# Patient Record
Sex: MALE | Race: WHITE | NOT HISPANIC OR LATINO | Employment: UNEMPLOYED | URBAN - METROPOLITAN AREA
[De-identification: names, ages, dates, MRNs, and addresses within clinical notes are randomized per-mention and may not be internally consistent; named-entity substitution may affect disease eponyms.]

---

## 2020-02-07 ENCOUNTER — HOSPITAL ENCOUNTER (EMERGENCY)
Facility: HOSPITAL | Age: 60
Discharge: HOME | End: 2020-02-07
Attending: EMERGENCY MEDICINE
Payer: MEDICAID

## 2020-02-07 VITALS
DIASTOLIC BLOOD PRESSURE: 111 MMHG | SYSTOLIC BLOOD PRESSURE: 196 MMHG | RESPIRATION RATE: 20 BRPM | HEART RATE: 109 BPM | OXYGEN SATURATION: 99 % | TEMPERATURE: 98 F

## 2020-02-07 DIAGNOSIS — S22.41XD MULTIPLE FRACTURES OF RIBS, RIGHT SIDE, SUBSEQUENT ENCOUNTER FOR FRACTURE WITH ROUTINE HEALING: Primary | ICD-10-CM

## 2020-02-07 PROCEDURE — 99283 EMERGENCY DEPT VISIT LOW MDM: CPT | Mod: 25

## 2020-02-07 PROCEDURE — 25000003 PHARM REV CODE 250: Performed by: EMERGENCY MEDICINE

## 2020-02-07 RX ORDER — HYDROCODONE BITARTRATE AND ACETAMINOPHEN 5; 325 MG/1; MG/1
1 TABLET ORAL
Status: COMPLETED | OUTPATIENT
Start: 2020-02-07 | End: 2020-02-07

## 2020-02-07 RX ORDER — HYDROCODONE BITARTRATE AND ACETAMINOPHEN 5; 325 MG/1; MG/1
1 TABLET ORAL EVERY 4 HOURS PRN
Qty: 18 TABLET | Refills: 0 | Status: SHIPPED | OUTPATIENT
Start: 2020-02-07

## 2020-02-07 RX ADMIN — HYDROCODONE BITARTRATE AND ACETAMINOPHEN 1 TABLET: 5; 325 TABLET ORAL at 10:02

## 2020-02-07 NOTE — ED NOTES
Pt states he fell last night and landed on his ribs and hip.  Pt able to ambulate around room without difficulty but states his pain is more to his right ribs.  Pt states he was unable to go to sleep last pm due to the pain.
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
## 2020-05-31 ENCOUNTER — HOSPITAL ENCOUNTER (INPATIENT)
Facility: HOSPITAL | Age: 60
LOS: 2 days | Discharge: HOME | End: 2020-06-02
Attending: EMERGENCY MEDICINE | Admitting: INTERNAL MEDICINE
Payer: MEDICAID

## 2020-05-31 DIAGNOSIS — R06.02 SOB (SHORTNESS OF BREATH): ICD-10-CM

## 2020-05-31 DIAGNOSIS — F10.10 ALCOHOL ABUSE: ICD-10-CM

## 2020-05-31 DIAGNOSIS — K92.2 ACUTE GI BLEEDING: Primary | ICD-10-CM

## 2020-05-31 LAB
ABO + RH BLD: NORMAL
ALBUMIN SERPL BCP-MCNC: 3 G/DL (ref 3.5–5.2)
ALP SERPL-CCNC: 58 U/L (ref 55–135)
ALT SERPL W/O P-5'-P-CCNC: 29 U/L (ref 10–44)
ANION GAP SERPL CALC-SCNC: 8 MMOL/L (ref 8–16)
APTT PPP: 25.5 SEC (ref 23.6–33.3)
AST SERPL-CCNC: 26 U/L (ref 10–40)
BASOPHILS # BLD AUTO: 0.01 K/UL (ref 0–0.2)
BASOPHILS NFR BLD: 0.1 % (ref 0–1.9)
BILIRUB SERPL-MCNC: 1.3 MG/DL (ref 0.1–1)
BLD GP AB SCN CELLS X3 SERPL QL: NORMAL
BNP SERPL-MCNC: 37 PG/ML (ref 0–99)
BUN SERPL-MCNC: 57 MG/DL (ref 6–20)
CALCIUM SERPL-MCNC: 8.2 MG/DL (ref 8.7–10.5)
CHLORIDE SERPL-SCNC: 102 MMOL/L (ref 95–110)
CO2 SERPL-SCNC: 23 MMOL/L (ref 23–29)
CREAT SERPL-MCNC: 1.1 MG/DL (ref 0.5–1.4)
DIFFERENTIAL METHOD: ABNORMAL
EOSINOPHIL # BLD AUTO: 0 K/UL (ref 0–0.5)
EOSINOPHIL NFR BLD: 0 % (ref 0–8)
ERYTHROCYTE [DISTWIDTH] IN BLOOD BY AUTOMATED COUNT: 12.9 % (ref 11.5–14.5)
EST. GFR  (AFRICAN AMERICAN): >60 ML/MIN/1.73 M^2
EST. GFR  (NON AFRICAN AMERICAN): >60 ML/MIN/1.73 M^2
GLUCOSE SERPL-MCNC: 224 MG/DL (ref 70–110)
HCT VFR BLD AUTO: 22.7 % (ref 40–54)
HCT VFR BLD AUTO: 25.6 % (ref 40–54)
HGB BLD-MCNC: 7.9 G/DL (ref 14–18)
HGB BLD-MCNC: 9 G/DL (ref 14–18)
IMM GRANULOCYTES # BLD AUTO: 0.08 K/UL (ref 0–0.04)
IMM GRANULOCYTES NFR BLD AUTO: 0.7 % (ref 0–0.5)
INR PPP: 1.1
LYMPHOCYTES # BLD AUTO: 0.9 K/UL (ref 1–4.8)
LYMPHOCYTES NFR BLD: 8.1 % (ref 18–48)
MAGNESIUM SERPL-MCNC: 1.8 MG/DL (ref 1.6–2.6)
MCH RBC QN AUTO: 33 PG (ref 27–31)
MCHC RBC AUTO-ENTMCNC: 35.2 G/DL (ref 32–36)
MCV RBC AUTO: 94 FL (ref 82–98)
MONOCYTES # BLD AUTO: 0.9 K/UL (ref 0.3–1)
MONOCYTES NFR BLD: 8 % (ref 4–15)
NEUTROPHILS # BLD AUTO: 9.1 K/UL (ref 1.8–7.7)
NEUTROPHILS NFR BLD: 83.1 % (ref 38–73)
NRBC BLD-RTO: 0 /100 WBC
OB PNL STL: POSITIVE
PLATELET # BLD AUTO: 184 K/UL (ref 150–350)
PMV BLD AUTO: 9.8 FL (ref 9.2–12.9)
POTASSIUM SERPL-SCNC: 4.4 MMOL/L (ref 3.5–5.1)
PROT SERPL-MCNC: 5.4 G/DL (ref 6–8.4)
PROTHROMBIN TIME: 14 SEC (ref 10.6–14.8)
RBC # BLD AUTO: 2.73 M/UL (ref 4.6–6.2)
SARS-COV-2 RDRP RESP QL NAA+PROBE: NEGATIVE
SODIUM SERPL-SCNC: 133 MMOL/L (ref 136–145)
TROPONIN I SERPL DL<=0.01 NG/ML-MCNC: 0.03 NG/ML
WBC # BLD AUTO: 10.99 K/UL (ref 3.9–12.7)

## 2020-05-31 PROCEDURE — U0002 COVID-19 LAB TEST NON-CDC: HCPCS

## 2020-05-31 PROCEDURE — 86850 RBC ANTIBODY SCREEN: CPT

## 2020-05-31 PROCEDURE — 27000221 HC OXYGEN, UP TO 24 HOURS

## 2020-05-31 PROCEDURE — 25000003 PHARM REV CODE 250: Performed by: NURSE PRACTITIONER

## 2020-05-31 PROCEDURE — 25000003 PHARM REV CODE 250: Performed by: EMERGENCY MEDICINE

## 2020-05-31 PROCEDURE — 85610 PROTHROMBIN TIME: CPT

## 2020-05-31 PROCEDURE — 25000003 PHARM REV CODE 250: Performed by: INTERNAL MEDICINE

## 2020-05-31 PROCEDURE — 94761 N-INVAS EAR/PLS OXIMETRY MLT: CPT

## 2020-05-31 PROCEDURE — 83036 HEMOGLOBIN GLYCOSYLATED A1C: CPT

## 2020-05-31 PROCEDURE — 85014 HEMATOCRIT: CPT

## 2020-05-31 PROCEDURE — 96375 TX/PRO/DX INJ NEW DRUG ADDON: CPT

## 2020-05-31 PROCEDURE — C9113 INJ PANTOPRAZOLE SODIUM, VIA: HCPCS | Performed by: EMERGENCY MEDICINE

## 2020-05-31 PROCEDURE — 86920 COMPATIBILITY TEST SPIN: CPT

## 2020-05-31 PROCEDURE — 99900035 HC TECH TIME PER 15 MIN (STAT)

## 2020-05-31 PROCEDURE — 36430 TRANSFUSION BLD/BLD COMPNT: CPT

## 2020-05-31 PROCEDURE — 12000002 HC ACUTE/MED SURGE SEMI-PRIVATE ROOM

## 2020-05-31 PROCEDURE — C9113 INJ PANTOPRAZOLE SODIUM, VIA: HCPCS | Performed by: INTERNAL MEDICINE

## 2020-05-31 PROCEDURE — 85730 THROMBOPLASTIN TIME PARTIAL: CPT

## 2020-05-31 PROCEDURE — 20000000 HC ICU ROOM

## 2020-05-31 PROCEDURE — 82272 OCCULT BLD FECES 1-3 TESTS: CPT

## 2020-05-31 PROCEDURE — 99291 CRITICAL CARE FIRST HOUR: CPT

## 2020-05-31 PROCEDURE — 36415 COLL VENOUS BLD VENIPUNCTURE: CPT

## 2020-05-31 PROCEDURE — 83880 ASSAY OF NATRIURETIC PEPTIDE: CPT

## 2020-05-31 PROCEDURE — 85025 COMPLETE CBC W/AUTO DIFF WBC: CPT

## 2020-05-31 PROCEDURE — 84484 ASSAY OF TROPONIN QUANT: CPT

## 2020-05-31 PROCEDURE — 83735 ASSAY OF MAGNESIUM: CPT

## 2020-05-31 PROCEDURE — 80053 COMPREHEN METABOLIC PANEL: CPT

## 2020-05-31 PROCEDURE — P9016 RBC LEUKOCYTES REDUCED: HCPCS

## 2020-05-31 PROCEDURE — 96365 THER/PROPH/DIAG IV INF INIT: CPT

## 2020-05-31 PROCEDURE — 63600175 PHARM REV CODE 636 W HCPCS: Performed by: NURSE PRACTITIONER

## 2020-05-31 PROCEDURE — 63600175 PHARM REV CODE 636 W HCPCS: Performed by: EMERGENCY MEDICINE

## 2020-05-31 PROCEDURE — 63600175 PHARM REV CODE 636 W HCPCS: Performed by: INTERNAL MEDICINE

## 2020-05-31 PROCEDURE — 85018 HEMOGLOBIN: CPT | Mod: 91

## 2020-05-31 PROCEDURE — 93005 ELECTROCARDIOGRAM TRACING: CPT | Performed by: INTERNAL MEDICINE

## 2020-05-31 RX ORDER — HYDROCODONE BITARTRATE AND ACETAMINOPHEN 500; 5 MG/1; MG/1
TABLET ORAL
Status: DISCONTINUED | OUTPATIENT
Start: 2020-05-31 | End: 2020-06-02 | Stop reason: HOSPADM

## 2020-05-31 RX ORDER — OCTREOTIDE ACETATE 100 UG/ML
50 INJECTION, SOLUTION INTRAVENOUS; SUBCUTANEOUS ONCE
Status: COMPLETED | OUTPATIENT
Start: 2020-05-31 | End: 2020-05-31

## 2020-05-31 RX ORDER — SODIUM CHLORIDE 0.9 % (FLUSH) 0.9 %
10 SYRINGE (ML) INJECTION
Status: DISCONTINUED | OUTPATIENT
Start: 2020-05-31 | End: 2020-06-02 | Stop reason: HOSPADM

## 2020-05-31 RX ORDER — ACETAMINOPHEN 500 MG
1000 TABLET ORAL EVERY 6 HOURS PRN
COMMUNITY

## 2020-05-31 RX ORDER — POLYETHYLENE GLYCOL 3350, SODIUM CHLORIDE, SODIUM BICARBONATE, POTASSIUM CHLORIDE 420; 11.2; 5.72; 1.48 G/4L; G/4L; G/4L; G/4L
1 POWDER, FOR SOLUTION ORAL ONCE
Status: DISCONTINUED | OUTPATIENT
Start: 2020-05-31 | End: 2020-05-31

## 2020-05-31 RX ORDER — ONDANSETRON 2 MG/ML
4 INJECTION INTRAMUSCULAR; INTRAVENOUS EVERY 6 HOURS PRN
Status: DISCONTINUED | OUTPATIENT
Start: 2020-05-31 | End: 2020-06-02 | Stop reason: HOSPADM

## 2020-05-31 RX ORDER — SODIUM CHLORIDE 9 MG/ML
INJECTION, SOLUTION INTRAVENOUS CONTINUOUS
Status: DISCONTINUED | OUTPATIENT
Start: 2020-05-31 | End: 2020-06-01

## 2020-05-31 RX ORDER — LORAZEPAM 2 MG/ML
1 INJECTION INTRAMUSCULAR
Status: DISCONTINUED | OUTPATIENT
Start: 2020-05-31 | End: 2020-06-02 | Stop reason: HOSPADM

## 2020-05-31 RX ORDER — ACETAMINOPHEN 325 MG/1
650 TABLET ORAL EVERY 4 HOURS PRN
Status: DISCONTINUED | OUTPATIENT
Start: 2020-05-31 | End: 2020-06-02 | Stop reason: HOSPADM

## 2020-05-31 RX ORDER — CHLORDIAZEPOXIDE HYDROCHLORIDE 25 MG/1
25 CAPSULE, GELATIN COATED ORAL 3 TIMES DAILY
Status: DISCONTINUED | OUTPATIENT
Start: 2020-05-31 | End: 2020-06-02 | Stop reason: HOSPADM

## 2020-05-31 RX ORDER — PANTOPRAZOLE SODIUM 40 MG/10ML
80 INJECTION, POWDER, LYOPHILIZED, FOR SOLUTION INTRAVENOUS
Status: COMPLETED | OUTPATIENT
Start: 2020-05-31 | End: 2020-05-31

## 2020-05-31 RX ADMIN — PANTOPRAZOLE SODIUM 8 MG/HR: 40 INJECTION, POWDER, FOR SOLUTION INTRAVENOUS at 05:05

## 2020-05-31 RX ADMIN — PANTOPRAZOLE SODIUM 8 MG/HR: 40 INJECTION, POWDER, FOR SOLUTION INTRAVENOUS at 12:05

## 2020-05-31 RX ADMIN — SODIUM CHLORIDE: 0.9 INJECTION, SOLUTION INTRAVENOUS at 03:05

## 2020-05-31 RX ADMIN — LORAZEPAM 1 MG: 2 INJECTION INTRAMUSCULAR; INTRAVENOUS at 04:05

## 2020-05-31 RX ADMIN — PANTOPRAZOLE SODIUM 8 MG/HR: 40 INJECTION, POWDER, FOR SOLUTION INTRAVENOUS at 11:05

## 2020-05-31 RX ADMIN — PANTOPRAZOLE SODIUM 80 MG: 40 INJECTION, POWDER, FOR SOLUTION INTRAVENOUS at 11:05

## 2020-05-31 RX ADMIN — FOLIC ACID: 5 INJECTION, SOLUTION INTRAMUSCULAR; INTRAVENOUS; SUBCUTANEOUS at 05:05

## 2020-05-31 RX ADMIN — PANTOPRAZOLE SODIUM 8 MG/HR: 40 INJECTION, POWDER, FOR SOLUTION INTRAVENOUS at 08:05

## 2020-05-31 RX ADMIN — OCTREOTIDE ACETATE 50 MCG/HR: 500 INJECTION, SOLUTION INTRAVENOUS; SUBCUTANEOUS at 05:05

## 2020-05-31 RX ADMIN — SODIUM CHLORIDE 500 ML: 0.9 INJECTION, SOLUTION INTRAVENOUS at 12:05

## 2020-05-31 RX ADMIN — OCTREOTIDE ACETATE 50 MCG/HR: 500 INJECTION, SOLUTION INTRAVENOUS; SUBCUTANEOUS at 11:05

## 2020-05-31 RX ADMIN — OCTREOTIDE ACETATE 50 MCG: 100 INJECTION, SOLUTION INTRAVENOUS; SUBCUTANEOUS at 05:05

## 2020-05-31 RX ADMIN — CHLORDIAZEPOXIDE HYDROCHLORIDE 25 MG: 25 CAPSULE ORAL at 08:05

## 2020-05-31 NOTE — H&P
[facility] History & Physical Examination   Patient Name: Howard Jefferson Jr.  MRN: [id number]  Patient Class: IP- Inpatient   Admission Date: 5/31/2020 11:07 AM  Length of Stay: 0  Attending Physician: Leonardo Brantley MD  Primary Care Provider: Primary Doctor No  Face-to-Face encounter date: 05/31/2020  Code Status: full code  Chief Complaint: Shortness of Breath (x 2 days) and Cough        Patient information was obtained from patient, past medical records and ER records.   HISTORY OF PRESENT ILLNESS:   Howard Jefferson Jr. is a 60 y.o. White male who  has a past medical history of Hypertension and Stroke.. The patient presented to [facility] on 5/31/2020 with a primary complaint of Shortness of Breath (x 2 days) and Cough    History was obtained from the patient and the family and ER physician Sign-out. Patient presents to the ED with the complaint of SOB, black stools, and vomiting over the past 2 days. The patient reports a total of 3 BMs at home that he describes looks like tar. This morning his stool had small amount of bright red blood. He has associated nausea with NBNB emesis. SOB is worse with exertion. No alleviating factors. He states this morning he became extremely weak, he could barely walk, so his sons put him in the car and brought him to the ED to be evaluated. He reports he drinks about 12pk of beer a day. His last beer was on yesterday. He reports a history of HTN, but is not currently on any home medication. He denies fever, chills, abdominal pain, hematemesis, hemoptysis, numbness, tingling, or LOC.     In the ED, he is mildly hypotensive. CBC with H/H 9/25. CMP with BUN 57, sodium 133, glucose 224. BNP and troponin was negative. EKG with ST at 113 and nonspecific ST/T wave abnormality.     Decision to admit was taken and patient was informed about the plan of care.   REVIEW OF SYSTEMS:   10 Point Review of System was performed and was found to be  "negative except for that mentioned already in the HPI above.     PAST MEDICAL HISTORY:     Past Medical History:   Diagnosis Date    Hypertension     Stroke        PAST SURGICAL HISTORY:   No past surgical history on file.    ALLERGIES:   Patient has no known allergies.    FAMILY HISTORY:   No family history on file.    SOCIAL HISTORY:     Social History     Tobacco Use    Smoking status: Current Every Day Smoker   Substance Use Topics    Alcohol use: Yes        Social History     Substance and Sexual Activity   Sexual Activity Not on file        HOME MEDICATIONS:     Prior to Admission medications    Medication Sig Start Date End Date Taking? Authorizing Provider   acetaminophen (TYLENOL) 500 MG tablet Take 1,000 mg by mouth every 6 (six) hours as needed for Pain.   Yes Historical Provider, MD         PHYSICAL EXAM:   BP 99/65   Pulse 98   Temp 97.8 °F (36.6 °C) (Oral)   Resp (!) 28   Ht 5' 11" (1.803 m)   Wt 63.5 kg (140 lb)   SpO2 97%   BMI 19.53 kg/m²   Vitals Reviewed  General appearance: Well-developed, thin White male who is sickly appearing.  Skin: No Rash. Warm, dry  Neuro: Generalized weakness. Motor and sensory exams grossly intact. Good tone. Power in all 4 extremities 5/5.   HENT: Atraumatic head. Moist mucous membranes of oral cavity.  Eyes: Normal extraocular movements. PERRLA  Neck: Supple. No evidence of lymphadenopathy. No thyroidomegaly.  Lungs: Clear to auscultation bilaterally. No wheezing present.   Heart: Regular rhythm. Mild tachycardia 105bpm. S1 and S2 present with no murmurs/gallop/rub. No pedal edema. No JVD present.   Abdomen: Soft, non-distended, non-tender. No rebound tenderness/guarding. No masses or organomegaly. Bowel sounds are normal. Bladder is not palpable.   Extremities: No cyanosis, clubbing. Capillary refill less than 2 seconds.   Psych/mental status: Alert and oriented. Mood and affect appropriate. Cooperative. Responds appropriately to questions.   EMERGENCY " DEPARTMENT LABS AND IMAGING:     Labs Reviewed   CBC W/ AUTO DIFFERENTIAL - Abnormal; Notable for the following components:       Result Value    RBC 2.73 (*)     Hemoglobin 9.0 (*)     Hematocrit 25.6 (*)     Mean Corpuscular Hemoglobin 33.0 (*)     Immature Granulocytes 0.7 (*)     Gran # (ANC) 9.1 (*)     Immature Grans (Abs) 0.08 (*)     Lymph # 0.9 (*)     Gran% 83.1 (*)     Lymph% 8.1 (*)     All other components within normal limits   COMPREHENSIVE METABOLIC PANEL - Abnormal; Notable for the following components:    Sodium 133 (*)     Glucose 224 (*)     BUN, Bld 57 (*)     Calcium 8.2 (*)     Total Protein 5.4 (*)     Albumin 3.0 (*)     Total Bilirubin 1.3 (*)     All other components within normal limits   OCCULT BLOOD X 1, STOOL - Abnormal; Notable for the following components:    Occult Blood Positive (*)     All other components within normal limits   TROPONIN I   B-TYPE NATRIURETIC PEPTIDE   PROTIME-INR   APTT   SARS-COV-2 RNA AMPLIFICATION, QUAL   MAGNESIUM   HEMOGLOBIN   HEMATOCRIT   TYPE & SCREEN   PREPARE RBC SOFT       X-Ray Chest AP Portable   Final Result          ASSESSMENT & PLAN:   Acute GI bleed:  Admit to ICU  Consult GI  Protonix IV bolus given and continuous gtt started  Clear liquids; Golytely prep per GI recommendation  Plans for EGD/colonoscopy by GI  IV hydration  Serial H/H at 9/25 now  Type and screen; prepare 2 units PRBC    Symptomatic Anemia:  Likely due to GI bleed  Treatment as above    ETOH dependence:  Last drink was yesterday per patient; normally drinks 12 pk of beer daily  Give banana bag x1 now  Start librium 25mg tid  Ativan prn; monitor effect on BP     Alcoholic cirrhosis:  Liver enzymes wnl   Platelets stable at 184  No ascites noted   Bili mildly elevated 1.3  Monitor levels      DVT Prophylaxis: Mechanical DVT prophylaxis and will be advised to be as mobile as possible and sit in a chair as tolerated.    ________________________________________________________________________________    Discharge Planning and Disposition: No mobility needs. Ambulating well. Patient will be discharged in 2-3 days  Face-to-Face encounter date: 05/31/2020  Encounter included review of the medical records, interviewing and examining the patient face-to-face, discussion with family and other health care providers including emergency medicine physician, admission orders, interpreting lab/test results and formulating a plan of care.   Medical Decision Making during this encounter was  [_] Low Complexity  [_] Moderate Complexity  [x] High Complexity    Critical care time: 51 minutes  _________________________________________________________________________________    INPATIENT LIST OF MEDICATIONS     Current Facility-Administered Medications:     0.9%  NaCl infusion, , Intravenous, Continuous, Nicki Dietrich NP    acetaminophen tablet 650 mg, 650 mg, Oral, Q4H PRN, Nicki Dietrich NP    ondansetron injection 4 mg, 4 mg, Intravenous, Q6H PRN, Nicki Dietrich NP    pantoprazole 40 mg in dextrose 5 % 100 mL infusion (ready to mix system), 8 mg/hr, Intravenous, Continuous, Ramin Watson DO, Last Rate: 20 mL/hr at 05/31/20 1238, 8 mg/hr at 05/31/20 1238    peg-electrolyte soln 420 gram SolR 4,000 mL, 1 each, Oral, Once, Nicki Dietrich NP    sodium chloride 0.9% 1,000 mL with mvi, adult no.4 with vit K 3,300 unit- 150 mcg/10 mL 10 mL, thiamine 100 mg, folic acid 1 mg infusion, , Intravenous, Once, Nicki Dietrich NP    sodium chloride 0.9% flush 10 mL, 10 mL, Intravenous, PRN, Nicki Dietrich NP    Current Outpatient Medications:     acetaminophen (TYLENOL) 500 MG tablet, Take 1,000 mg by mouth every 6 (six) hours as needed for Pain., Disp: , Rfl:       Scheduled Meds:   peg-electrolyte soln  1 each Oral Once    Banana bag   Intravenous Once     Continuous Infusions:   sodium chloride 0.9%      pantoprazole 40  mg in dextrose 5 % 100 mL infusion (ready to mix system) 8 mg/hr (05/31/20 1238)     PRN Meds:.acetaminophen, ondansetron, sodium chloride 0.9%      Nicki Dietrich  [facility] Hospitalist  05/31/2020

## 2020-05-31 NOTE — ED NOTES
Patient's daughter in law (Gilda [phone]) called for an update.  Patient gave consent to give Gilda update.

## 2020-05-31 NOTE — ED PROVIDER NOTES
Encounter Date: 5/31/2020       History     Chief Complaint   Patient presents with    Shortness of Breath     x 2 days    Cough     This is a 60-year-old male with no significant past medical history other than alcohol abuse who presents emergency department with shortness of breath, black stools, vomiting.  This is for the last 3 days he has had black stools.  He said he had 3 episode of dark woken stool this morning.  He said he also had some bright red blood tinged stuff in his stool as well.  He has had some intermittent vomiting.  He has not had any associated abdominal pain.  He says he has had some shortness of breath with exertion and sometimes he has a cough but he has some vomiting after he coughs.  He denies any associated chest pain.  He does not have any fever or chills.  No recent travel no one else sick at home.  No contact with corona virus.  He has never had any surgeries on his abdomen in the past.  He says he drinks a 12 pack a day approximately.  He is not on any medications specifically not on any blood thinners.  He denies any street drug use.        Review of patient's allergies indicates:  No Known Allergies  Past Medical History:   Diagnosis Date    Hypertension     Stroke      No past surgical history on file.  No family history on file.  Social History     Tobacco Use    Smoking status: Current Every Day Smoker   Substance Use Topics    Alcohol use: Yes    Drug use: Not Currently     Review of Systems   Constitutional: Positive for appetite change (Poor p.o. intake). Negative for chills and fever.   HENT: Negative for sore throat.    Respiratory: Positive for cough and shortness of breath.    Cardiovascular: Negative for chest pain.   Gastrointestinal: Positive for blood in stool, nausea and vomiting. Negative for abdominal pain.   Genitourinary: Negative for dysuria.   Musculoskeletal: Negative for back pain.   Skin: Negative for rash.   Neurological: Negative for seizures,  weakness and headaches.   Hematological: Does not bruise/bleed easily.   All other systems reviewed and are negative.      Physical Exam     Initial Vitals [05/31/20 1108]   BP Pulse Resp Temp SpO2   123/77 (!) 118 (!) 28 97.8 °F (36.6 °C) 97 %      MAP       --         Physical Exam    Nursing note and vitals reviewed.  Constitutional: He appears well-developed and well-nourished. He is not diaphoretic. No distress.   Cachectic   HENT:   Head: Normocephalic and atraumatic.   Mouth/Throat: Oropharynx is clear and moist. No oropharyngeal exudate.   Eyes: Conjunctivae and EOM are normal. Pupils are equal, round, and reactive to light.   Neck: No tracheal deviation present.   Cardiovascular: Regular rhythm, normal heart sounds and intact distal pulses.   No murmur heard.  Tachycardic   Pulmonary/Chest: Breath sounds normal. No stridor. No respiratory distress. He has no wheezes. He has no rhonchi. He has no rales.   Abdominal: Soft. Bowel sounds are normal. He exhibits no distension. There is no tenderness. There is no rebound and no guarding.   Genitourinary: Rectal exam shows guaiac positive stool. Guaiac positive stool. : Acceptable.  Genitourinary Comments: Rectal exam chaperoned by nursing staff:  Stool is maroon in color.  No active external hemorrhoids noted.   Musculoskeletal: Normal range of motion. He exhibits no edema or tenderness.   Neurological: He is alert and oriented to person, place, and time. He has normal strength. No cranial nerve deficit or sensory deficit.   Skin: Skin is warm and dry. Capillary refill takes less than 2 seconds. No rash noted. No erythema. No pallor.   Psychiatric: He has a normal mood and affect. His behavior is normal. Thought content normal.         ED Course   Procedures  Labs Reviewed   CBC W/ AUTO DIFFERENTIAL   COMPREHENSIVE METABOLIC PANEL   TROPONIN I   B-TYPE NATRIURETIC PEPTIDE   PROTIME-INR   APTT   OCCULT BLOOD X 1, STOOL   SARS-COV-2 RNA  AMPLIFICATION, QUAL   TYPE & SCREEN     EKG Readings: (Independently Interpreted)   EKG 11:21 a.m. sinus tachycardia rate of 113.  Nonspecific ST T wave changes.  No STEMI.  Normal axis.  EKG interpreted independently.        Results for orders placed or performed during the hospital encounter of 05/31/20   CBC auto differential   Result Value Ref Range    WBC 10.99 3.90 - 12.70 K/uL    RBC 2.73 (L) 4.60 - 6.20 M/uL    Hemoglobin 9.0 (L) 14.0 - 18.0 g/dL    Hematocrit 25.6 (L) 40.0 - 54.0 %    Mean Corpuscular Volume 94 82 - 98 fL    Mean Corpuscular Hemoglobin 33.0 (H) 27.0 - 31.0 pg    Mean Corpuscular Hemoglobin Conc 35.2 32.0 - 36.0 g/dL    RDW 12.9 11.5 - 14.5 %    Platelets 184 150 - 350 K/uL    MPV 9.8 9.2 - 12.9 fL    Immature Granulocytes 0.7 (H) 0.0 - 0.5 %    Gran # (ANC) 9.1 (H) 1.8 - 7.7 K/uL    Immature Grans (Abs) 0.08 (H) 0.00 - 0.04 K/uL    Lymph # 0.9 (L) 1.0 - 4.8 K/uL    Mono # 0.9 0.3 - 1.0 K/uL    Eos # 0.0 0.0 - 0.5 K/uL    Baso # 0.01 0.00 - 0.20 K/uL    nRBC 0 0 /100 WBC    Gran% 83.1 (H) 38.0 - 73.0 %    Lymph% 8.1 (L) 18.0 - 48.0 %    Mono% 8.0 4.0 - 15.0 %    Eosinophil% 0.0 0.0 - 8.0 %    Basophil% 0.1 0.0 - 1.9 %    Differential Method Automated    Comprehensive metabolic panel   Result Value Ref Range    Sodium 133 (L) 136 - 145 mmol/L    Potassium 4.4 3.5 - 5.1 mmol/L    Chloride 102 95 - 110 mmol/L    CO2 23 23 - 29 mmol/L    Glucose 224 (H) 70 - 110 mg/dL    BUN, Bld 57 (H) 6 - 20 mg/dL    Creatinine 1.1 0.5 - 1.4 mg/dL    Calcium 8.2 (L) 8.7 - 10.5 mg/dL    Total Protein 5.4 (L) 6.0 - 8.4 g/dL    Albumin 3.0 (L) 3.5 - 5.2 g/dL    Total Bilirubin 1.3 (H) 0.1 - 1.0 mg/dL    Alkaline Phosphatase 58 55 - 135 U/L    AST 26 10 - 40 U/L    ALT 29 10 - 44 U/L    Anion Gap 8 8 - 16 mmol/L    eGFR if African American >60.0 >60 mL/min/1.73 m^2    eGFR if non African American >60.0 >60 mL/min/1.73 m^2   Troponin I   Result Value Ref Range    Troponin I 0.031 <=0.040 ng/mL   Brain natriuretic  peptide   Result Value Ref Range    BNP 37 0 - 99 pg/mL   Protime-INR   Result Value Ref Range    PT 14.0 10.6 - 14.8 sec    INR 1.1    APTT   Result Value Ref Range    aPTT 25.5 23.6 - 33.3 sec   Occult blood x 1, stool   Result Value Ref Range    Occult Blood Positive (A) Negative   COVID-19 Rapid Screening   Result Value Ref Range    SARS-CoV-2 RNA, Amplification, Qual Negative Negative   Magnesium   Result Value Ref Range    Magnesium 1.8 1.6 - 2.6 mg/dL   Type & Screen   Result Value Ref Range    Group & Rh O NEG     Indirect Moise NEG      X-Ray Chest AP Portable   Final Result          Imaging Results          X-Ray Chest AP Portable (In process)                  Medical Decision Making:   ED Management:  Patient presents emergency department with signs and symptoms of upper GI bleed however he also noted some bright red blood in his stool.  On his exam he did have maroonish type stool but he endorses history of black stool.  I discussed with GI who plans to do upper and lower endoscopy.  Started him on a Protonix bolus and drip.  GI stated to hold off on any octreotide at this point.  They will see him in consultation plan to scope him tomorrow.  He has been given 500 cc of fluid emergency department.  Blood pressure stable pulses 110 range.  H/H 9/25              Attending Attestation:         Attending Critical Care:   Critical Care Times:   Direct Patient Care (initial evaluation, reassessments, and time considering the case)................................................................15 minutes.   Additional History from reviewing old medical records or taking additional history from the family, EMS, PCP, etc.......................5 minutes.   Ordering, Reviewing, and Interpreting Diagnostic Studies...............................................................................................................5 minutes.    Documentation..................................................................................................................................................................................5 minutes.   Consultation with other Physicians. .................................................................................................................................................5 minutes.   ==============================================================  · Total Critical Care Time - exclusive of procedural time: 35 minutes.  ==============================================================  Critical care was necessary to treat or prevent imminent or life-threatening deterioration of the following conditions: GI bleeding.   The following critical care procedures were done by me (see procedure notes): blood draw for specimens and pulse oximetry.   Critical care was time spent personally by me on the following activities: obtaining history from patient or relative, examination of patient, review of old charts, ordering lab, x-rays, and/or EKG, development of treatment plan with patient or relative, ordering and performing treatments and interventions, evaluation of patient's response to treatment, discussion with consultants, interpretation of cardiac measurements and re-evaluation of patient's conition.   Critical Care Condition: potentially life-threatening               ED Course as of May 31 1314   Sun May 31, 2020   1213 I discussed the case with Dr. Benites from GI who stated to prep him for an upper and lower scope period was recommended GoLYTELY prep.  Clear liquids.    [JR]   1304 I discussed the case with nurse practitioner who will admit the patient to the hospital.    [JR]      ED Course User Index  [JR] Ramin Watson DO                Clinical Impression:       ICD-10-CM ICD-9-CM   1. Acute GI bleeding K92.2 578.9   2. SOB (shortness of breath) R06.02 786.05   3. Alcohol abuse F10.10 305.00                                 Ramin Watson,   05/31/20 1317

## 2020-05-31 NOTE — NURSING
Pt received to room 2212 from Er. Attached to monitor. Protonix infusing at 8 mg/hr. See nursing exam.

## 2020-06-01 ENCOUNTER — ANESTHESIA EVENT (OUTPATIENT)
Dept: SURGERY | Facility: HOSPITAL | Age: 60
End: 2020-06-01
Payer: MEDICAID

## 2020-06-01 ENCOUNTER — ANESTHESIA (OUTPATIENT)
Dept: SURGERY | Facility: HOSPITAL | Age: 60
End: 2020-06-01
Payer: MEDICAID

## 2020-06-01 LAB
ALBUMIN SERPL BCP-MCNC: 2.6 G/DL (ref 3.5–5.2)
ALP SERPL-CCNC: 45 U/L (ref 55–135)
ALT SERPL W/O P-5'-P-CCNC: 30 U/L (ref 10–44)
ANION GAP SERPL CALC-SCNC: 7 MMOL/L (ref 8–16)
AST SERPL-CCNC: 47 U/L (ref 10–40)
BASOPHILS # BLD AUTO: 0.04 K/UL (ref 0–0.2)
BASOPHILS NFR BLD: 0.6 % (ref 0–1.9)
BILIRUB SERPL-MCNC: 1.4 MG/DL (ref 0.1–1)
BUN SERPL-MCNC: 39 MG/DL (ref 6–20)
CALCIUM SERPL-MCNC: 7.8 MG/DL (ref 8.7–10.5)
CHLORIDE SERPL-SCNC: 107 MMOL/L (ref 95–110)
CO2 SERPL-SCNC: 23 MMOL/L (ref 23–29)
CREAT SERPL-MCNC: 0.7 MG/DL (ref 0.5–1.4)
DIFFERENTIAL METHOD: ABNORMAL
EOSINOPHIL # BLD AUTO: 0.1 K/UL (ref 0–0.5)
EOSINOPHIL NFR BLD: 0.9 % (ref 0–8)
ERYTHROCYTE [DISTWIDTH] IN BLOOD BY AUTOMATED COUNT: 14.2 % (ref 11.5–14.5)
EST. GFR  (AFRICAN AMERICAN): >60 ML/MIN/1.73 M^2
EST. GFR  (NON AFRICAN AMERICAN): >60 ML/MIN/1.73 M^2
ESTIMATED AVG GLUCOSE: 117 MG/DL (ref 68–131)
GLUCOSE SERPL-MCNC: 124 MG/DL (ref 70–110)
HBA1C MFR BLD HPLC: 5.7 % (ref 4.5–6.2)
HCT VFR BLD AUTO: 22.1 % (ref 40–54)
HCT VFR BLD AUTO: 22.3 % (ref 40–54)
HCT VFR BLD AUTO: 23.2 % (ref 40–54)
HCT VFR BLD AUTO: 23.2 % (ref 40–54)
HCT VFR BLD AUTO: 23.4 % (ref 40–54)
HCT VFR BLD AUTO: 23.8 % (ref 40–54)
HGB BLD-MCNC: 7.6 G/DL (ref 14–18)
HGB BLD-MCNC: 7.7 G/DL (ref 14–18)
HGB BLD-MCNC: 7.9 G/DL (ref 14–18)
HGB BLD-MCNC: 8 G/DL (ref 14–18)
HGB BLD-MCNC: 8 G/DL (ref 14–18)
HGB BLD-MCNC: 8.2 G/DL (ref 14–18)
HGB BLD-MCNC: 8.2 G/DL (ref 14–18)
HGB BLD-MCNC: 8.5 G/DL (ref 14–18)
IMM GRANULOCYTES # BLD AUTO: 0.04 K/UL (ref 0–0.04)
IMM GRANULOCYTES NFR BLD AUTO: 0.6 % (ref 0–0.5)
LYMPHOCYTES # BLD AUTO: 1.5 K/UL (ref 1–4.8)
LYMPHOCYTES NFR BLD: 20.9 % (ref 18–48)
MCH RBC QN AUTO: 32.4 PG (ref 27–31)
MCHC RBC AUTO-ENTMCNC: 34.5 G/DL (ref 32–36)
MCV RBC AUTO: 94 FL (ref 82–98)
MONOCYTES # BLD AUTO: 1 K/UL (ref 0.3–1)
MONOCYTES NFR BLD: 14 % (ref 4–15)
NEUTROPHILS # BLD AUTO: 4.4 K/UL (ref 1.8–7.7)
NEUTROPHILS NFR BLD: 63 % (ref 38–73)
NRBC BLD-RTO: 0 /100 WBC
PLATELET # BLD AUTO: 129 K/UL (ref 150–350)
PMV BLD AUTO: 10 FL (ref 9.2–12.9)
POTASSIUM SERPL-SCNC: 4.1 MMOL/L (ref 3.5–5.1)
PROT SERPL-MCNC: 4.6 G/DL (ref 6–8.4)
RBC # BLD AUTO: 2.47 M/UL (ref 4.6–6.2)
SODIUM SERPL-SCNC: 137 MMOL/L (ref 136–145)
WBC # BLD AUTO: 7 K/UL (ref 3.9–12.7)

## 2020-06-01 PROCEDURE — 25000003 PHARM REV CODE 250

## 2020-06-01 PROCEDURE — 43239 EGD BIOPSY SINGLE/MULTIPLE: CPT | Performed by: INTERNAL MEDICINE

## 2020-06-01 PROCEDURE — 27000284 HC CANNULA NASAL: Performed by: ANESTHESIOLOGY

## 2020-06-01 PROCEDURE — C9113 INJ PANTOPRAZOLE SODIUM, VIA: HCPCS | Performed by: INTERNAL MEDICINE

## 2020-06-01 PROCEDURE — 37000008 HC ANESTHESIA 1ST 15 MINUTES: Performed by: INTERNAL MEDICINE

## 2020-06-01 PROCEDURE — 85025 COMPLETE CBC W/AUTO DIFF WBC: CPT

## 2020-06-01 PROCEDURE — 27000650 HC AIRWAY EXCHANGE: Performed by: ANESTHESIOLOGY

## 2020-06-01 PROCEDURE — 36415 COLL VENOUS BLD VENIPUNCTURE: CPT

## 2020-06-01 PROCEDURE — 80053 COMPREHEN METABOLIC PANEL: CPT

## 2020-06-01 PROCEDURE — 85014 HEMATOCRIT: CPT

## 2020-06-01 PROCEDURE — 25000003 PHARM REV CODE 250: Performed by: INTERNAL MEDICINE

## 2020-06-01 PROCEDURE — 25000003 PHARM REV CODE 250: Performed by: NURSE ANESTHETIST, CERTIFIED REGISTERED

## 2020-06-01 PROCEDURE — 63600175 PHARM REV CODE 636 W HCPCS: Performed by: INTERNAL MEDICINE

## 2020-06-01 PROCEDURE — 27000673 HC TUBING BLOOD Y: Performed by: ANESTHESIOLOGY

## 2020-06-01 PROCEDURE — 27200043 HC FORCEPS, BIOPSY: Performed by: INTERNAL MEDICINE

## 2020-06-01 PROCEDURE — 25000003 PHARM REV CODE 250: Performed by: NURSE PRACTITIONER

## 2020-06-01 PROCEDURE — 12000002 HC ACUTE/MED SURGE SEMI-PRIVATE ROOM

## 2020-06-01 PROCEDURE — 94761 N-INVAS EAR/PLS OXIMETRY MLT: CPT

## 2020-06-01 PROCEDURE — 63600175 PHARM REV CODE 636 W HCPCS: Performed by: NURSE ANESTHETIST, CERTIFIED REGISTERED

## 2020-06-01 PROCEDURE — 85018 HEMOGLOBIN: CPT | Mod: 91

## 2020-06-01 PROCEDURE — 37000009 HC ANESTHESIA EA ADD 15 MINS: Performed by: INTERNAL MEDICINE

## 2020-06-01 RX ORDER — CHLORHEXIDINE GLUCONATE ORAL RINSE 1.2 MG/ML
15 SOLUTION DENTAL 2 TIMES DAILY
Status: DISCONTINUED | OUTPATIENT
Start: 2020-06-01 | End: 2020-06-02 | Stop reason: HOSPADM

## 2020-06-01 RX ORDER — MUPIROCIN 20 MG/G
OINTMENT TOPICAL 2 TIMES DAILY
Status: DISCONTINUED | OUTPATIENT
Start: 2020-06-01 | End: 2020-06-02 | Stop reason: HOSPADM

## 2020-06-01 RX ORDER — PROPOFOL 10 MG/ML
VIAL (ML) INTRAVENOUS
Status: DISCONTINUED | OUTPATIENT
Start: 2020-06-01 | End: 2020-06-01

## 2020-06-01 RX ORDER — PANTOPRAZOLE SODIUM 40 MG/10ML
40 INJECTION, POWDER, LYOPHILIZED, FOR SOLUTION INTRAVENOUS 2 TIMES DAILY
Status: DISCONTINUED | OUTPATIENT
Start: 2020-06-01 | End: 2020-06-02 | Stop reason: HOSPADM

## 2020-06-01 RX ORDER — SODIUM CHLORIDE 9 MG/ML
INJECTION, SOLUTION INTRAVENOUS CONTINUOUS PRN
Status: DISCONTINUED | OUTPATIENT
Start: 2020-06-01 | End: 2020-06-01

## 2020-06-01 RX ADMIN — OCTREOTIDE ACETATE 50 MCG/HR: 500 INJECTION, SOLUTION INTRAVENOUS; SUBCUTANEOUS at 08:06

## 2020-06-01 RX ADMIN — CHLORDIAZEPOXIDE HYDROCHLORIDE 25 MG: 25 CAPSULE ORAL at 09:06

## 2020-06-01 RX ADMIN — PANTOPRAZOLE SODIUM 8 MG/HR: 40 INJECTION, POWDER, FOR SOLUTION INTRAVENOUS at 03:06

## 2020-06-01 RX ADMIN — SODIUM CHLORIDE: 0.9 INJECTION, SOLUTION INTRAVENOUS at 08:06

## 2020-06-01 RX ADMIN — PROPOFOL 25 MG: 10 INJECTION, EMULSION INTRAVENOUS at 08:06

## 2020-06-01 RX ADMIN — MUPIROCIN: 20 OINTMENT TOPICAL at 10:06

## 2020-06-01 RX ADMIN — SODIUM CHLORIDE: 0.9 INJECTION, SOLUTION INTRAVENOUS at 12:06

## 2020-06-01 RX ADMIN — PANTOPRAZOLE SODIUM 40 MG: 40 INJECTION, POWDER, FOR SOLUTION INTRAVENOUS at 09:06

## 2020-06-01 RX ADMIN — PANTOPRAZOLE SODIUM 8 MG/HR: 40 INJECTION, POWDER, FOR SOLUTION INTRAVENOUS at 08:06

## 2020-06-01 RX ADMIN — CHLORDIAZEPOXIDE HYDROCHLORIDE 25 MG: 25 CAPSULE ORAL at 03:06

## 2020-06-01 RX ADMIN — CHLORHEXIDINE GLUCONATE 15 ML: 1.2 RINSE ORAL at 10:06

## 2020-06-01 RX ADMIN — CHLORDIAZEPOXIDE HYDROCHLORIDE 25 MG: 25 CAPSULE ORAL at 10:06

## 2020-06-01 RX ADMIN — CHLORHEXIDINE GLUCONATE 15 ML: 1.2 RINSE ORAL at 09:06

## 2020-06-01 RX ADMIN — PROPOFOL 50 MG: 10 INJECTION, EMULSION INTRAVENOUS at 08:06

## 2020-06-01 NOTE — ANESTHESIA POSTPROCEDURE EVALUATION
Anesthesia Post Evaluation    Patient: Howard Jefferson JrEly    Procedure(s) Performed: Procedure(s) (LRB):  EGD (ESOPHAGOGASTRODUODENOSCOPY) (N/A)    Final Anesthesia Type: MAC    Patient location during evaluation: ICU  Patient participation: Yes- Able to Participate  Level of consciousness: awake and alert and oriented  Post-procedure vital signs: reviewed and stable  Pain management: adequate  Airway patency: patent    PONV status at discharge: No PONV  Anesthetic complications: no      Cardiovascular status: blood pressure returned to baseline and hemodynamically stable  Respiratory status: unassisted, spontaneous ventilation and nasal cannula  Hydration status: euvolemic  Follow-up not needed.          Vitals Value Taken Time   /82 6/1/2020 10:45 AM   Temp 36.7 °C (98.1 °F) 6/1/2020  7:01 AM   Pulse 83 6/1/2020 10:59 AM   Resp 32 6/1/2020 10:59 AM   SpO2 98 % 6/1/2020 10:59 AM   Vitals shown include unvalidated device data.      No case tracking events are documented in the log.      Pain/Kristin Score: No data recorded

## 2020-06-01 NOTE — HPI
Howard Jefferson Jr. is a 60 y.o. White male who  has a past medical history of Hypertension and Stroke.. The patient presented to [facility] on 5/31/2020 with a primary complaint of Shortness of Breath (x 2 days) and Cough     History was obtained from the patient and the family and ER physician Sign-out. Patient presents to the ED with the complaint of SOB, black stools, and vomiting over the past 2 days. The patient reports a total of 3 BMs at home that he describes looks like tar. This morning his stool had small amount of bright red blood. He has associated nausea with NBNB emesis. SOB is worse with exertion. No alleviating factors. He states this morning he became extremely weak, he could barely walk, so his sons put him in the car and brought him to the ED to be evaluated. He reports he drinks about 12pk of beer a day. His last beer was on yesterday. He reports a history of HTN, but is not currently on any home medication. He denies fever, chills, abdominal pain, hematemesis, hemoptysis, numbness, tingling, or LOC.      In the ED, he is mildly hypotensive. CBC with H/H 9/25. CMP with BUN 57, sodium 133, glucose 224. BNP and troponin was negative. EKG with ST at 113 and nonspecific ST/T wave abnormality.

## 2020-06-01 NOTE — HOSPITAL COURSE
06/01 Underwent EGD where duodenitis was seen. Iv PPI discontinued and started on pantoprazole 40 mg po bid. Patient feels well. GI recommends observation overnight and if H/H stablize to be discharged in AM with outpatient follow-up for colonoscopy.

## 2020-06-01 NOTE — [employer] PATIENT INSTRUCTIONS
Discharge Summary/Instructions after an Endoscopic Procedure  Patient Name: Howard Jefferson  Patient MRN: [id number]  Patient YOB: 1960 Monday, June 1, 2020  Tito Higgins MD  RESTRICTIONS:  During your procedure today, you received medications for sedation.  These   medications may affect your judgment, balance and coordination.  Therefore,   for 24 hours, you have the following restrictions:   - DO NOT drive a car, operate machinery, make legal/financial decisions,   sign important papers or drink alcohol.    ACTIVITY:  Today: no heavy lifting, straining or running due to procedural   sedation/anesthesia.  The following day: return to full activity including work.  DIET:  Eat and drink normally unless instructed otherwise.     TREATMENT FOR COMMON SIDE EFFECTS:  - Mild abdominal pain, nausea, belching, bloating or excessive gas:  rest,   eat lightly and use a heating pad.  - Sore Throat: treat with throat lozenges and/or gargle with warm salt   water.  - Because air was used during the procedure, expelling large amounts of air   from your rectum or belching is normal.  - If a bowel prep was taken, you may not have a bowel movement for 1-3 days.    This is normal.  SYMPTOMS TO WATCH FOR AND REPORT TO YOUR PHYSICIAN:  1. Abdominal pain or bloating, other than gas cramps.  2. Chest pain.  3. Back pain.  4. Signs of infection such as: chills or fever occurring within 24 hours   after the procedure.  5. Rectal bleeding, which would show as bright red, maroon, or black stools.   (A tablespoon of blood from the rectum is not serious, especially if   hemorrhoids are present.)  6. Vomiting.  7. Weakness or dizziness.  GO DIRECTLY TO THE NEAREST EMERGENCY ROOM IF YOU HAVE ANY OF THE FOLLOWING:      Difficulty breathing              Chills and/or fever over 101 F   Persistent vomiting and/or vomiting blood   Severe abdominal pain   Severe chest pain   Black, tarry stools   Bleeding- more than one tablespoon   Any other  symptom or condition that you feel may need urgent attention  Your doctor recommends these additional instructions:  If any biopsies were taken, your doctors clinic will contact you in 1 to 2   weeks with any results.  - Await pathology results.   - Repeat egd in 8 weeks  - PPI bid x 8 weeks  - STOP etoh  - Outpatient colonoscopy recommended.  - Return patient to [facility] for ongoing care.  For questions, problems or results please call your physician - Tito Higgins MD at Work:  [phone].  Counts include 234 beds at the [facility], EMERGENCY ROOM PHONE NUMBER: [phone]  IF A COMPLICATION OR EMERGENCY SITUATION ARISES AND YOU ARE UNABLE TO REACH   YOUR PHYSICIAN - GO DIRECTLY TO THE EMERGENCY ROOM.  MD Tito Farooq MD  6/1/2020 9:05:39 AM  This report has been verified and signed electronically.  [employer]

## 2020-06-01 NOTE — ANESTHESIA PREPROCEDURE EVALUATION
06/01/2020  Howard Jefferson Jr. is a 60 y.o., male.    Patient Active Problem List   Diagnosis    Acute GI bleeding       History reviewed. No pertinent surgical history.     Tobacco Use:  The patient  reports that he has been smoking cigarettes. He has a 60.00 pack-year smoking history. He does not have any smokeless tobacco history on file.     Results for orders placed or performed during the hospital encounter of 05/31/20   EKG 12-lead    Collection Time: 05/31/20 11:21 AM    Narrative    Test Reason : R06.02,    Vent. Rate : 113 BPM     Atrial Rate : 113 BPM     P-R Int : 128 ms          QRS Dur : 088 ms      QT Int : 362 ms       P-R-T Axes : 068 027 -04 degrees     QTc Int : 496 ms    Sinus tachycardia  Nonspecific ST and T wave abnormality  Abnormal ECG  No previous ECGs available    Referred By: [email or web address]   SELF           Confirmed By:         Imaging Results          X-Ray Chest AP Portable (Final result)  Result time 05/31/20 11:40:26    Final result by Smith Uribe MD (05/31/20 11:40:26)                 Narrative:    REASON: SOB    FINDINGS:    Portable chest radiograph with no comparison. The cardiomediastinal  silhouette is within normal limits in size.The pulmonary vascular  structures are within normal limits. The lungs are clear. No acute  osseous abnormality. A round metallic structures measuring 2 mm is  seen projecting over the right lung apex and right upper extremity.    IMPRESSION:    No acute cardiopulmonary process.  Round metallic structures measuring 2 mm projecting over the right  lung apex and right upper extremity may reflect a foreign body or be  external to the patient.    Electronically Signed by Smith Uribe on 5/31/2020 11:50 AM                               Lab Results   Component Value Date    WBC 7.00 06/01/2020    HGB 8.0 (L) 06/01/2020    HGB 8.0 (L) 06/01/2020     HCT 23.2 (L) 06/01/2020    HCT 23.2 (L) 06/01/2020    MCV 94 06/01/2020     (L) 06/01/2020     BMP  Lab Results   Component Value Date     06/01/2020    K 4.1 06/01/2020     06/01/2020    CO2 23 06/01/2020    BUN 39 (H) 06/01/2020    CREATININE 0.7 06/01/2020    CALCIUM 7.8 (L) 06/01/2020    ANIONGAP 7 (L) 06/01/2020    ESTGFRAFRICA >60.0 06/01/2020    EGFRNONAA >60.0 06/01/2020             Anesthesia Evaluation    I have reviewed the Patient Summary Reports.    I have reviewed the Nursing Notes. I have reviewed the NPO Status.      Review of Systems  Anesthesia Hx:  History of prior surgery of interest to airway management or planning: Previous anesthesia: MAC Denies Family Hx of Anesthesia complications.   Denies Personal Hx of Anesthesia complications.   Social:  Smoker, Alcohol Use    Hematology/Oncology:         -- Anemia:   Cardiovascular:   Hypertension, poorly controlled    Hepatic/GI:   Liver Disease, (cirrhosis) Acute GI bleed with melena   Neurological:   CVA (hx of CVA remote assoc with drug abuse (cocaine)), no residual symptoms    Psych:   Psychiatric History (EtOH abuse)          Physical Exam  General:  Well nourished    Airway/Jaw/Neck:  Airway Findings: Mouth Opening: Normal Tongue: Normal  General Airway Assessment: Adult  Mallampati: II  TM Distance: Normal, at least 6 cm  Jaw/Neck Findings:  Neck ROM: Normal ROM      Dental:  Dental Findings: Upper partial dentures, Periodontal disease, Mild   Chest/Lungs:  Chest/Lungs Findings: Clear to auscultation, Normal Respiratory Rate     Heart/Vascular:  Heart Findings: Rate: Normal  Rhythm: Regular Rhythm  Sounds: Normal     Abdomen:  Abdomen Findings: Normal    Musculoskeletal:  Musculoskeletal Findings: Normal   Skin:  Skin Findings: Normal    Mental Status:  Mental Status Findings:  Cooperative, Alert and Oriented         Anesthesia Plan  Type of Anesthesia, risks & benefits discussed:  Anesthesia Type:  MAC  Patient's Preference:    Intra-op Monitoring Plan: standard ASA monitors  Intra-op Monitoring Plan Comments:   Post Op Pain Control Plan: per primary service following discharge from PACU  Post Op Pain Control Plan Comments:   Induction:    Beta Blocker:         Informed Consent: Patient understands risks and agrees with Anesthesia plan.  Questions answered. Anesthesia consent signed with patient.  ASA Score: 3  emergent   Day of Surgery Review of History & Physical:        Anesthesia Plan Notes: MAC  Propofol        Ready For Surgery From Anesthesia Perspective.

## 2020-06-01 NOTE — NURSING
Pt care assumed. Pt asleep in bed. Easily arousable to voice. Oriented x 3. Follows commands. MAEE. No verbal complaints. NSR on tele. Sandostatin, protonix drips maintained. See charted assessment. Call light within reach.

## 2020-06-01 NOTE — PROGRESS NOTES
[facility] Medicine  Progress Note    Patient Name: Howard Jefferson Jr.  MRN: [id number]  Patient Class: IP- Inpatient   Admission Date: 5/31/2020  Length of Stay: 1 days  Attending Physician: Leonardo Haas MD  Primary Care Provider: Primary Doctor No        Subjective:     Principal Problem:Acute GI bleeding        HPI:  Howard Jefferson Jr. is a 60 y.o. White male who  has a past medical history of Hypertension and Stroke.. The patient presented to [facility] on 5/31/2020 with a primary complaint of Shortness of Breath (x 2 days) and Cough     History was obtained from the patient and the family and ER physician Sign-out. Patient presents to the ED with the complaint of SOB, black stools, and vomiting over the past 2 days. The patient reports a total of 3 BMs at home that he describes looks like tar. This morning his stool had small amount of bright red blood. He has associated nausea with NBNB emesis. SOB is worse with exertion. No alleviating factors. He states this morning he became extremely weak, he could barely walk, so his sons put him in the car and brought him to the ED to be evaluated. He reports he drinks about 12pk of beer a day. His last beer was on yesterday. He reports a history of HTN, but is not currently on any home medication. He denies fever, chills, abdominal pain, hematemesis, hemoptysis, numbness, tingling, or LOC.      In the ED, he is mildly hypotensive. CBC with H/H 9/25. CMP with BUN 57, sodium 133, glucose 224. BNP and troponin was negative. EKG with ST at 113 and nonspecific ST/T wave abnormality.      Overview/Hospital Course:  06/01 Underwent EGD where duodenitis was seen. Iv PPI discontinued and started on pantoprazole 40 mg po bid. Patient feels well. GI recommends observation overnight and if H/H stablize to be discharged in AM with outpatient follow-up for colonoscopy.    Interval History: stable for transfer out of ICU    Review of Systems    Constitutional: Negative.    HENT: Negative.    Eyes: Negative.    Respiratory: Negative.    Cardiovascular: Negative.    Gastrointestinal: Negative.    Endocrine: Negative.    Genitourinary: Negative.    Musculoskeletal: Negative.    Skin: Negative.    Allergic/Immunologic: Negative.    Neurological: Negative.    Hematological: Negative.    All other systems reviewed and are negative.    Objective:     Vital Signs (Most Recent):  Temp: 98.7 °F (37.1 °C) (06/01/20 1539)  Pulse: 80 (06/01/20 1500)  Resp: (!) 25 (06/01/20 1400)  BP: 119/72 (06/01/20 1500)  SpO2: 98 % (06/01/20 1500) Vital Signs (24h Range):  Temp:  [98.1 °F (36.7 °C)-99.2 °F (37.3 °C)] 98.7 °F (37.1 °C)  Pulse:  [] 80  Resp:  [18-69] 25  SpO2:  [93 %-100 %] 98 %  BP: ()/(52-89) 119/72     Weight: 66 kg (145 lb 8.1 oz)  Body mass index is 20.29 kg/m².    Intake/Output Summary (Last 24 hours) at 6/1/2020 1600  Last data filed at 6/1/2020 1500  Gross per 24 hour   Intake 4077.75 ml   Output 2600 ml   Net 1477.75 ml      Physical Exam   Constitutional: He is oriented to person, place, and time. He appears well-developed and well-nourished.   HENT:   Head: Normocephalic and atraumatic.   Right Ear: External ear normal.   Left Ear: External ear normal.   Nose: Nose normal.   Mouth/Throat: Oropharynx is clear and moist.   Eyes: Pupils are equal, round, and reactive to light. Conjunctivae and EOM are normal.   Neck: Normal range of motion. Neck supple.   Cardiovascular: Normal rate, regular rhythm, normal heart sounds and intact distal pulses.   Pulmonary/Chest: Effort normal and breath sounds normal.   Abdominal: Soft. Bowel sounds are normal.   Musculoskeletal: Normal range of motion.   Neurological: He is alert and oriented to person, place, and time.   Skin: Skin is warm and dry. Capillary refill takes less than 2 seconds.   Psychiatric: He has a normal mood and affect. His behavior is normal. Judgment and thought content normal.   Nursing  note and vitals reviewed.      Significant Labs:   CBC:   Recent Labs   Lab 05/31/20  1128  05/31/20  2332 06/01/20  0418 06/01/20  1205   WBC 10.99  --   --  7.00  --    HGB 9.0*   < > 8.5* 8.0*  8.0* 8.2*  8.2*   HCT 25.6*   < > 23.8* 23.2*  23.2* 23.8*  23.8*     --   --  129*  --     < > = values in this interval not displayed.     CMP:   Recent Labs   Lab 05/31/20  1128 06/01/20  0418   * 137   K 4.4 4.1    107   CO2 23 23   * 124*   BUN 57* 39*   CREATININE 1.1 0.7   CALCIUM 8.2* 7.8*   PROT 5.4* 4.6*   ALBUMIN 3.0* 2.6*   BILITOT 1.3* 1.4*   ALKPHOS 58 45*   AST 26 47*   ALT 29 30   ANIONGAP 8 7*   EGFRNONAA >60.0 >60.0       Significant Imaging: I have reviewed and interpreted all pertinent imaging results/findings within the past 24 hours.      Assessment/Plan:      * Acute GI bleeding  Transfer out of ICU to med/surg; serial H/H, if stable discharge in AM        VTE Risk Mitigation (From admission, onward)         Ordered     Place XIN hose  Until discontinued      05/31/20 1336     IP VTE LOW RISK PATIENT  Once      05/31/20 1336                      Leonardo Haas MD  Department of Hospital Medicine   [facility]

## 2020-06-01 NOTE — PLAN OF CARE
Ongoing education provided regarding POC and medications. Questions encouraged.  Family at bedside during day. Updates provided.  Supportive of patient.   Safety maintained. Bed alarm activated. Instructed on fall precautions and to call for assistance.  Assist with mobility as needed.   Denies pain at present time.   Maintains sats on RA.   Tolerating diet with no N/V.  Good appetite  Serial H/H

## 2020-06-01 NOTE — PLAN OF CARE
Problem: Adult Inpatient Plan of Care  Goal: Plan of Care Review  Outcome: Ongoing, Progressing     Problem: Adult Inpatient Plan of Care  Goal: Rounds/Family Conference  Outcome: Ongoing, Progressing     Problem: Fall Injury Risk  Goal: Absence of Fall and Fall-Related Injury  Outcome: Ongoing, Progressing

## 2020-06-01 NOTE — TRANSFER OF CARE
"Anesthesia Transfer of Care Note    Patient: Howard Jefferson Jr.    Procedure(s) Performed: Procedure(s) (LRB):  EGD (ESOPHAGOGASTRODUODENOSCOPY) (N/A)    Patient location: ICU    Anesthesia Type: MAC    Post pain: adequate analgesia    Post assessment: no apparent anesthetic complications    Post vital signs: stable    Level of consciousness: awake, alert and oriented    Nausea/Vomiting: no nausea/vomiting    Complications: none    Transfer of care protocol was followed      Last vitals:   Visit Vitals  /73 (BP Location: Right arm)   Pulse 76   Temp 36.7 °C (98.1 °F) (Oral)   Resp 18   Ht 5' 11" (1.803 m)   Wt 66 kg (145 lb 8.1 oz)   SpO2 (!) 94%   BMI 20.29 kg/m²     "

## 2020-06-01 NOTE — CONSULTS
Chief Complaint:  melena    HPI:  60 year old male with daily heavy etoh abuse presented to the hospital with dark black tarlike stool associated with fatigue present for 2 days prior to admission.  Admits to 12 pack beer plus etoh daily.  No nausea,vomiting,abd pain.  Last egd was years ago.  BUN 57 on admit.  hgb 9.    Received 1 unit of blood.        Review of Systems:  Complete ROS performed and negative unless stated above in HPI          Past Medical History:   Diagnosis Date    Cirrhosis     ETOH abuse     Hypertension     Stroke        History reviewed. No pertinent surgical history.    History reviewed. No pertinent family history.    Social History     Socioeconomic History    Marital status:      Spouse name: Not on file    Number of children: Not on file    Years of education: Not on file    Highest education level: Not on file   Occupational History    Not on file   Social Needs    Financial resource strain: Not on file    Food insecurity:     Worry: Not on file     Inability: Not on file    Transportation needs:     Medical: Not on file     Non-medical: Not on file   Tobacco Use    Smoking status: Current Every Day Smoker     Packs/day: 1.50     Years: 40.00     Pack years: 60.00     Types: Cigarettes   Substance and Sexual Activity    Alcohol use: Yes     Alcohol/week: 12.0 standard drinks     Types: 12 Cans of beer per week     Comment: Daily    Drug use: Not Currently    Sexual activity: Not on file   Lifestyle    Physical activity:     Days per week: Not on file     Minutes per session: Not on file    Stress: Not on file   Relationships    Social connections:     Talks on phone: Not on file     Gets together: Not on file     Attends [employer]ism service: Not on file     Active member of club or organization: Not on file     Attends meetings of clubs or organizations: Not on file     Relationship status: Not on file   Other Topics Concern    Not on file   Social History  "Narrative    Not on file       Review of patient's allergies indicates:  No Known Allergies    No current facility-administered medications on file prior to encounter.      Current Outpatient Medications on File Prior to Encounter   Medication Sig Dispense Refill    acetaminophen (TYLENOL) 500 MG tablet Take 1,000 mg by mouth every 6 (six) hours as needed for Pain.         Objective:  /73 (BP Location: Right arm)   Pulse 76   Temp 98.1 °F (36.7 °C) (Oral)   Resp 18   Ht 5' 11" (1.803 m)   Wt 66 kg (145 lb 8.1 oz)   SpO2 (!) 94%   BMI 20.29 kg/m²   General: chronically ill appearing, multiple tattoos  HE: ncat, perrl, eomi  ENT: op pink and moist without lesions or exudates  CV: +s1s2, no mrg, rrr  Resp: ctapb, no wrr  GI: bs active, abd soft, nt, nd  Skin: no lesions, no rash  Neuro: cn 2-12 in tact, no focal deficits, no asterixis  Psych: regular rate speech, normal affect    Labs:  Recent Results (from the past 2688 hour(s))   Occult blood x 1, stool    Collection Time: 05/31/20 11:15 AM   Result Value Ref Range    Occult Blood Positive (A) Negative   CBC auto differential    Collection Time: 05/31/20 11:28 AM   Result Value Ref Range    WBC 10.99 3.90 - 12.70 K/uL    RBC 2.73 (L) 4.60 - 6.20 M/uL    Hemoglobin 9.0 (L) 14.0 - 18.0 g/dL    Hematocrit 25.6 (L) 40.0 - 54.0 %    Mean Corpuscular Volume 94 82 - 98 fL    Mean Corpuscular Hemoglobin 33.0 (H) 27.0 - 31.0 pg    Mean Corpuscular Hemoglobin Conc 35.2 32.0 - 36.0 g/dL    RDW 12.9 11.5 - 14.5 %    Platelets 184 150 - 350 K/uL    MPV 9.8 9.2 - 12.9 fL    Immature Granulocytes 0.7 (H) 0.0 - 0.5 %    Gran # (ANC) 9.1 (H) 1.8 - 7.7 K/uL    Immature Grans (Abs) 0.08 (H) 0.00 - 0.04 K/uL    Lymph # 0.9 (L) 1.0 - 4.8 K/uL    Mono # 0.9 0.3 - 1.0 K/uL    Eos # 0.0 0.0 - 0.5 K/uL    Baso # 0.01 0.00 - 0.20 K/uL    nRBC 0 0 /100 WBC    Gran% 83.1 (H) 38.0 - 73.0 %    Lymph% 8.1 (L) 18.0 - 48.0 %    Mono% 8.0 4.0 - 15.0 %    Eosinophil% 0.0 0.0 - 8.0 %    " Basophil% 0.1 0.0 - 1.9 %    Differential Method Automated    Comprehensive metabolic panel    Collection Time: 05/31/20 11:28 AM   Result Value Ref Range    Sodium 133 (L) 136 - 145 mmol/L    Potassium 4.4 3.5 - 5.1 mmol/L    Chloride 102 95 - 110 mmol/L    CO2 23 23 - 29 mmol/L    Glucose 224 (H) 70 - 110 mg/dL    BUN, Bld 57 (H) 6 - 20 mg/dL    Creatinine 1.1 0.5 - 1.4 mg/dL    Calcium 8.2 (L) 8.7 - 10.5 mg/dL    Total Protein 5.4 (L) 6.0 - 8.4 g/dL    Albumin 3.0 (L) 3.5 - 5.2 g/dL    Total Bilirubin 1.3 (H) 0.1 - 1.0 mg/dL    Alkaline Phosphatase 58 55 - 135 U/L    AST 26 10 - 40 U/L    ALT 29 10 - 44 U/L    Anion Gap 8 8 - 16 mmol/L    eGFR if African American >60.0 >60 mL/min/1.73 m^2    eGFR if non African American >60.0 >60 mL/min/1.73 m^2   Troponin I    Collection Time: 05/31/20 11:28 AM   Result Value Ref Range    Troponin I 0.031 <=0.040 ng/mL   Brain natriuretic peptide    Collection Time: 05/31/20 11:28 AM   Result Value Ref Range    BNP 37 0 - 99 pg/mL   Protime-INR    Collection Time: 05/31/20 11:28 AM   Result Value Ref Range    PT 14.0 10.6 - 14.8 sec    INR 1.1    APTT    Collection Time: 05/31/20 11:28 AM   Result Value Ref Range    aPTT 25.5 23.6 - 33.3 sec   Type & Screen    Collection Time: 05/31/20 11:28 AM   Result Value Ref Range    Group & Rh O NEG     Indirect Moise NEG    Magnesium    Collection Time: 05/31/20 11:28 AM   Result Value Ref Range    Magnesium 1.8 1.6 - 2.6 mg/dL   Prepare RBC 2 Units; acute GI bleed    Collection Time: 05/31/20 11:28 AM   Result Value Ref Range    UNIT NUMBER [id number]     Product Code Z7595T99     DISPENSE STATUS CROSSMATCHED     CODING SYSTEM MVFV868     Unit Blood Type Code 9500     Unit Blood Type O NEG     Unit Expiration [id number]     UNIT NUMBER [id number]     Product Code F2678G37     DISPENSE STATUS TRANSFUSED     CODING SYSTEM [id number]     Unit Blood Type Code 9500     Unit Blood Type O NEG     Unit Expiration [id number]    COVID-19  Rapid Screening    Collection Time: 05/31/20 11:38 AM   Result Value Ref Range    SARS-CoV-2 RNA, Amplification, Qual Negative Negative   Hemoglobin    Collection Time: 05/31/20  4:14 PM   Result Value Ref Range    Hemoglobin 7.9 (L) 14.0 - 18.0 g/dL   Hematocrit    Collection Time: 05/31/20  4:14 PM   Result Value Ref Range    Hematocrit 22.7 (L) 40.0 - 54.0 %   Hemoglobin    Collection Time: 05/31/20 11:32 PM   Result Value Ref Range    Hemoglobin 8.5 (L) 14.0 - 18.0 g/dL   Hematocrit    Collection Time: 05/31/20 11:32 PM   Result Value Ref Range    Hematocrit 23.8 (L) 40.0 - 54.0 %   Comprehensive Metabolic Panel (CMP)    Collection Time: 06/01/20  4:18 AM   Result Value Ref Range    Sodium 137 136 - 145 mmol/L    Potassium 4.1 3.5 - 5.1 mmol/L    Chloride 107 95 - 110 mmol/L    CO2 23 23 - 29 mmol/L    Glucose 124 (H) 70 - 110 mg/dL    BUN, Bld 39 (H) 6 - 20 mg/dL    Creatinine 0.7 0.5 - 1.4 mg/dL    Calcium 7.8 (L) 8.7 - 10.5 mg/dL    Total Protein 4.6 (L) 6.0 - 8.4 g/dL    Albumin 2.6 (L) 3.5 - 5.2 g/dL    Total Bilirubin 1.4 (H) 0.1 - 1.0 mg/dL    Alkaline Phosphatase 45 (L) 55 - 135 U/L    AST 47 (H) 10 - 40 U/L    ALT 30 10 - 44 U/L    Anion Gap 7 (L) 8 - 16 mmol/L    eGFR if African American >60.0 >60 mL/min/1.73 m^2    eGFR if non African American >60.0 >60 mL/min/1.73 m^2   Hemoglobin    Collection Time: 06/01/20  4:18 AM   Result Value Ref Range    Hemoglobin 8.0 (L) 14.0 - 18.0 g/dL   Hematocrit    Collection Time: 06/01/20  4:18 AM   Result Value Ref Range    Hematocrit 23.2 (L) 40.0 - 54.0 %   CBC with Auto Differential    Collection Time: 06/01/20  4:18 AM   Result Value Ref Range    WBC 7.00 3.90 - 12.70 K/uL    RBC 2.47 (L) 4.60 - 6.20 M/uL    Hemoglobin 8.0 (L) 14.0 - 18.0 g/dL    Hematocrit 23.2 (L) 40.0 - 54.0 %    Mean Corpuscular Volume 94 82 - 98 fL    Mean Corpuscular Hemoglobin 32.4 (H) 27.0 - 31.0 pg    Mean Corpuscular Hemoglobin Conc 34.5 32.0 - 36.0 g/dL    RDW 14.2 11.5 - 14.5 %     Platelets 129 (L) 150 - 350 K/uL    MPV 10.0 9.2 - 12.9 fL    Immature Granulocytes 0.6 (H) 0.0 - 0.5 %    Gran # (ANC) 4.4 1.8 - 7.7 K/uL    Immature Grans (Abs) 0.04 0.00 - 0.04 K/uL    Lymph # 1.5 1.0 - 4.8 K/uL    Mono # 1.0 0.3 - 1.0 K/uL    Eos # 0.1 0.0 - 0.5 K/uL    Baso # 0.04 0.00 - 0.20 K/uL    nRBC 0 0 /100 WBC    Gran% 63.0 38.0 - 73.0 %    Lymph% 20.9 18.0 - 48.0 %    Mono% 14.0 4.0 - 15.0 %    Eosinophil% 0.9 0.0 - 8.0 %    Basophil% 0.6 0.0 - 1.9 %    Differential Method Automated         Assessment:  Melena  ETOH abuse    Plan:  Needs sobriety  Follow up with pcp/gi in his network on discharge  Recommend outpatient liver us and afp  egd now  ppi

## 2020-06-01 NOTE — BRIEF OP NOTE
EGD  -small HH with ring  -moderate hemorrhagic duodenitis     Plan  -ppi bid x 8 weeks  -monitor 24 hours and if h/h stable okay to discharge but will need outpatient colonoscopy in next few weeks  -repeat egd in 8 weeks with primary GI  -sobriety

## 2020-06-01 NOTE — SUBJECTIVE & OBJECTIVE
Interval History: stable for transfer out of ICU    Review of Systems   Constitutional: Negative.    HENT: Negative.    Eyes: Negative.    Respiratory: Negative.    Cardiovascular: Negative.    Gastrointestinal: Negative.    Endocrine: Negative.    Genitourinary: Negative.    Musculoskeletal: Negative.    Skin: Negative.    Allergic/Immunologic: Negative.    Neurological: Negative.    Hematological: Negative.    All other systems reviewed and are negative.    Objective:     Vital Signs (Most Recent):  Temp: 98.7 °F (37.1 °C) (06/01/20 1539)  Pulse: 80 (06/01/20 1500)  Resp: (!) 25 (06/01/20 1400)  BP: 119/72 (06/01/20 1500)  SpO2: 98 % (06/01/20 1500) Vital Signs (24h Range):  Temp:  [98.1 °F (36.7 °C)-99.2 °F (37.3 °C)] 98.7 °F (37.1 °C)  Pulse:  [] 80  Resp:  [18-69] 25  SpO2:  [93 %-100 %] 98 %  BP: ()/(52-89) 119/72     Weight: 66 kg (145 lb 8.1 oz)  Body mass index is 20.29 kg/m².    Intake/Output Summary (Last 24 hours) at 6/1/2020 1600  Last data filed at 6/1/2020 1500  Gross per 24 hour   Intake 4077.75 ml   Output 2600 ml   Net 1477.75 ml      Physical Exam   Constitutional: He is oriented to person, place, and time. He appears well-developed and well-nourished.   HENT:   Head: Normocephalic and atraumatic.   Right Ear: External ear normal.   Left Ear: External ear normal.   Nose: Nose normal.   Mouth/Throat: Oropharynx is clear and moist.   Eyes: Pupils are equal, round, and reactive to light. Conjunctivae and EOM are normal.   Neck: Normal range of motion. Neck supple.   Cardiovascular: Normal rate, regular rhythm, normal heart sounds and intact distal pulses.   Pulmonary/Chest: Effort normal and breath sounds normal.   Abdominal: Soft. Bowel sounds are normal.   Musculoskeletal: Normal range of motion.   Neurological: He is alert and oriented to person, place, and time.   Skin: Skin is warm and dry. Capillary refill takes less than 2 seconds.   Psychiatric: He has a normal mood and affect.  His behavior is normal. Judgment and thought content normal.   Nursing note and vitals reviewed.      Significant Labs:   CBC:   Recent Labs   Lab 05/31/20  1128  05/31/20  2332 06/01/20  0418 06/01/20  1205   WBC 10.99  --   --  7.00  --    HGB 9.0*   < > 8.5* 8.0*  8.0* 8.2*  8.2*   HCT 25.6*   < > 23.8* 23.2*  23.2* 23.8*  23.8*     --   --  129*  --     < > = values in this interval not displayed.     CMP:   Recent Labs   Lab 05/31/20  1128 06/01/20  0418   * 137   K 4.4 4.1    107   CO2 23 23   * 124*   BUN 57* 39*   CREATININE 1.1 0.7   CALCIUM 8.2* 7.8*   PROT 5.4* 4.6*   ALBUMIN 3.0* 2.6*   BILITOT 1.3* 1.4*   ALKPHOS 58 45*   AST 26 47*   ALT 29 30   ANIONGAP 8 7*   EGFRNONAA >60.0 >60.0       Significant Imaging: I have reviewed and interpreted all pertinent imaging results/findings within the past 24 hours.

## 2020-06-02 VITALS
WEIGHT: 145.5 LBS | TEMPERATURE: 98 F | BODY MASS INDEX: 20.37 KG/M2 | HEIGHT: 71 IN | RESPIRATION RATE: 104 BRPM | DIASTOLIC BLOOD PRESSURE: 71 MMHG | OXYGEN SATURATION: 99 % | SYSTOLIC BLOOD PRESSURE: 130 MMHG | HEART RATE: 80 BPM

## 2020-06-02 LAB
ANION GAP SERPL CALC-SCNC: 7 MMOL/L (ref 8–16)
BASOPHILS # BLD AUTO: 0.04 K/UL (ref 0–0.2)
BASOPHILS NFR BLD: 0.7 % (ref 0–1.9)
BUN SERPL-MCNC: 14 MG/DL (ref 6–20)
CALCIUM SERPL-MCNC: 7.7 MG/DL (ref 8.7–10.5)
CHLORIDE SERPL-SCNC: 104 MMOL/L (ref 95–110)
CO2 SERPL-SCNC: 24 MMOL/L (ref 23–29)
CREAT SERPL-MCNC: 0.6 MG/DL (ref 0.5–1.4)
DIFFERENTIAL METHOD: ABNORMAL
EOSINOPHIL # BLD AUTO: 0.2 K/UL (ref 0–0.5)
EOSINOPHIL NFR BLD: 2.6 % (ref 0–8)
ERYTHROCYTE [DISTWIDTH] IN BLOOD BY AUTOMATED COUNT: 13.6 % (ref 11.5–14.5)
EST. GFR  (AFRICAN AMERICAN): >60 ML/MIN/1.73 M^2
EST. GFR  (NON AFRICAN AMERICAN): >60 ML/MIN/1.73 M^2
GLUCOSE SERPL-MCNC: 109 MG/DL (ref 70–110)
HCT VFR BLD AUTO: 22.8 % (ref 40–54)
HCT VFR BLD AUTO: 22.8 % (ref 40–54)
HCT VFR BLD AUTO: 23.8 % (ref 40–54)
HCT VFR BLD AUTO: 24.4 % (ref 40–54)
HGB BLD-MCNC: 7.9 G/DL (ref 14–18)
HGB BLD-MCNC: 7.9 G/DL (ref 14–18)
HGB BLD-MCNC: 8.2 G/DL (ref 14–18)
HGB BLD-MCNC: 8.3 G/DL (ref 14–18)
IMM GRANULOCYTES # BLD AUTO: 0.03 K/UL (ref 0–0.04)
IMM GRANULOCYTES NFR BLD AUTO: 0.5 % (ref 0–0.5)
LYMPHOCYTES # BLD AUTO: 1.4 K/UL (ref 1–4.8)
LYMPHOCYTES NFR BLD: 23.8 % (ref 18–48)
MCH RBC QN AUTO: 32.4 PG (ref 27–31)
MCHC RBC AUTO-ENTMCNC: 34.6 G/DL (ref 32–36)
MCV RBC AUTO: 93 FL (ref 82–98)
MONOCYTES # BLD AUTO: 0.8 K/UL (ref 0.3–1)
MONOCYTES NFR BLD: 13.8 % (ref 4–15)
NEUTROPHILS # BLD AUTO: 3.3 K/UL (ref 1.8–7.7)
NEUTROPHILS NFR BLD: 58.6 % (ref 38–73)
NRBC BLD-RTO: 0 /100 WBC
PLATELET # BLD AUTO: 143 K/UL (ref 150–350)
PMV BLD AUTO: 9.5 FL (ref 9.2–12.9)
POTASSIUM SERPL-SCNC: 3.2 MMOL/L (ref 3.5–5.1)
RBC # BLD AUTO: 2.44 M/UL (ref 4.6–6.2)
SODIUM SERPL-SCNC: 135 MMOL/L (ref 136–145)
WBC # BLD AUTO: 5.71 K/UL (ref 3.9–12.7)

## 2020-06-02 PROCEDURE — 25000003 PHARM REV CODE 250: Performed by: INTERNAL MEDICINE

## 2020-06-02 PROCEDURE — 63600175 PHARM REV CODE 636 W HCPCS: Performed by: NURSE PRACTITIONER

## 2020-06-02 PROCEDURE — 80048 BASIC METABOLIC PNL TOTAL CA: CPT

## 2020-06-02 PROCEDURE — 36415 COLL VENOUS BLD VENIPUNCTURE: CPT

## 2020-06-02 PROCEDURE — 63600175 PHARM REV CODE 636 W HCPCS: Performed by: INTERNAL MEDICINE

## 2020-06-02 PROCEDURE — 25000003 PHARM REV CODE 250

## 2020-06-02 PROCEDURE — 85025 COMPLETE CBC W/AUTO DIFF WBC: CPT

## 2020-06-02 PROCEDURE — 85014 HEMATOCRIT: CPT | Mod: 91

## 2020-06-02 PROCEDURE — 94761 N-INVAS EAR/PLS OXIMETRY MLT: CPT

## 2020-06-02 PROCEDURE — C9113 INJ PANTOPRAZOLE SODIUM, VIA: HCPCS | Performed by: INTERNAL MEDICINE

## 2020-06-02 PROCEDURE — 85018 HEMOGLOBIN: CPT | Mod: 91

## 2020-06-02 RX ORDER — POTASSIUM CHLORIDE 7.45 MG/ML
40 INJECTION INTRAVENOUS
Status: DISCONTINUED | OUTPATIENT
Start: 2020-06-02 | End: 2020-06-02 | Stop reason: HOSPADM

## 2020-06-02 RX ORDER — LANOLIN ALCOHOL/MO/W.PET/CERES
800 CREAM (GRAM) TOPICAL
Status: DISCONTINUED | OUTPATIENT
Start: 2020-06-02 | End: 2020-06-02 | Stop reason: HOSPADM

## 2020-06-02 RX ORDER — POTASSIUM CHLORIDE 7.45 MG/ML
20 INJECTION INTRAVENOUS
Status: DISCONTINUED | OUTPATIENT
Start: 2020-06-02 | End: 2020-06-02 | Stop reason: HOSPADM

## 2020-06-02 RX ORDER — MAGNESIUM SULFATE 1 G/100ML
1 INJECTION INTRAVENOUS
Status: DISCONTINUED | OUTPATIENT
Start: 2020-06-02 | End: 2020-06-02 | Stop reason: HOSPADM

## 2020-06-02 RX ORDER — PANTOPRAZOLE SODIUM 40 MG/1
40 TABLET, DELAYED RELEASE ORAL DAILY
Qty: 30 TABLET | Refills: 11 | Status: SHIPPED | OUTPATIENT
Start: 2020-06-02 | End: 2021-06-02

## 2020-06-02 RX ORDER — MAGNESIUM SULFATE HEPTAHYDRATE 40 MG/ML
2 INJECTION, SOLUTION INTRAVENOUS
Status: DISCONTINUED | OUTPATIENT
Start: 2020-06-02 | End: 2020-06-02 | Stop reason: HOSPADM

## 2020-06-02 RX ORDER — POTASSIUM CHLORIDE 20 MEQ/1
20 TABLET, EXTENDED RELEASE ORAL
Status: DISCONTINUED | OUTPATIENT
Start: 2020-06-02 | End: 2020-06-02 | Stop reason: HOSPADM

## 2020-06-02 RX ORDER — POTASSIUM CHLORIDE 20 MEQ/1
40 TABLET, EXTENDED RELEASE ORAL
Status: DISCONTINUED | OUTPATIENT
Start: 2020-06-02 | End: 2020-06-02 | Stop reason: HOSPADM

## 2020-06-02 RX ORDER — MAGNESIUM SULFATE HEPTAHYDRATE 40 MG/ML
4 INJECTION, SOLUTION INTRAVENOUS
Status: DISCONTINUED | OUTPATIENT
Start: 2020-06-02 | End: 2020-06-02 | Stop reason: HOSPADM

## 2020-06-02 RX ORDER — PANTOPRAZOLE SODIUM 40 MG/1
40 TABLET, DELAYED RELEASE ORAL DAILY
Qty: 30 TABLET | Refills: 1 | Status: SHIPPED | OUTPATIENT
Start: 2020-07-15 | End: 2020-08-26

## 2020-06-02 RX ORDER — CHLORDIAZEPOXIDE HYDROCHLORIDE 10 MG/1
CAPSULE, GELATIN COATED ORAL
Qty: 24 CAPSULE | Refills: 0 | Status: SHIPPED | OUTPATIENT
Start: 2020-06-02 | End: 2020-06-10

## 2020-06-02 RX ADMIN — LORAZEPAM 1 MG: 2 INJECTION INTRAMUSCULAR; INTRAVENOUS at 12:06

## 2020-06-02 RX ADMIN — PANTOPRAZOLE SODIUM 40 MG: 40 INJECTION, POWDER, FOR SOLUTION INTRAVENOUS at 08:06

## 2020-06-02 RX ADMIN — POTASSIUM CHLORIDE 40 MEQ: 20 TABLET, EXTENDED RELEASE ORAL at 08:06

## 2020-06-02 RX ADMIN — CHLORHEXIDINE GLUCONATE 15 ML: 1.2 RINSE ORAL at 08:06

## 2020-06-02 RX ADMIN — CHLORDIAZEPOXIDE HYDROCHLORIDE 25 MG: 25 CAPSULE ORAL at 08:06

## 2020-06-02 NOTE — PLAN OF CARE
06/02/20 1210   Discharge Reassessment   Assessment Type Discharge Planning Reassessment   ICU Multidisciplinary Team rounds done with ICU director Dr. Saavedra, RN, Respiratory Therapy, Nutritionist, ICU Charge Nurse, Director of ICU Jose Cardona and Case Management,

## 2020-06-02 NOTE — PLAN OF CARE
Problem: Adult Inpatient Plan of Care  Goal: Plan of Care Review  Outcome: Ongoing, Progressing     Problem: Adult Inpatient Plan of Care  Goal: Patient-Specific Goal (Individualization)  Outcome: Ongoing, Progressing     Problem: Fall Injury Risk  Goal: Absence of Fall and Fall-Related Injury  Outcome: Ongoing, Progressing

## 2020-06-02 NOTE — DISCHARGE SUMMARY
[facility]  Discharge Summary      Patient Name: Howard Jefferson Jr.  MRN: [id number]  Admission Date: 5/31/2020  Hospital Length of Stay: 2 days  Discharge Date and Time: 6/2/2020  3:27 PM  Attending Physician: Dory Uribe DO   Discharging Provider: Dory Uribe DO  Primary Care Provider: Primary Doctor No      HPI per NP Mendez on 5/31  Howard Jefferson Jr. is a 60 y.o. White male who  has a past medical history of Hypertension and Stroke.. The patient presented to [facility] on 5/31/2020 with a primary complaint of Shortness of Breath (x 2 days) and Cough     History was obtained from the patient and the family and ER physician Sign-out. Patient presents to the ED with the complaint of SOB, black stools, and vomiting over the past 2 days. The patient reports a total of 3 BMs at home that he describes looks like tar. This morning his stool had small amount of bright red blood. He has associated nausea with NBNB emesis. SOB is worse with exertion. No alleviating factors. He states this morning he became extremely weak, he could barely walk, so his sons put him in the car and brought him to the ED to be evaluated. He reports he drinks about 12pk of beer a day. His last beer was on yesterday. He reports a history of HTN, but is not currently on any home medication. He denies fever, chills, abdominal pain, hematemesis, hemoptysis, numbness, tingling, or LOC.      In the ED, he is mildly hypotensive. CBC with H/H 9/25. CMP with BUN 57, sodium 133, glucose 224. BNP and troponin was negative. EKG with ST at 113 and nonspecific ST/T wave abnormality.      Procedure(s) (LRB):  EGD (ESOPHAGOGASTRODUODENOSCOPY) (N/A)      Hospital Course:   Patient was admitted on 05/31 for acute blood loss anemia secondary to acute GI bleed.  He was placed in ICU and started on Protonix drip.  Patient underwent EGG where duodenitis was seen. Iv PPI discontinued and started on pantoprazole  "40 mg po bid which he will contine on discharge for 6 weeks then Q day.  GI recommended observation overnight and H/H remained stable.  Patient was seen and examined.  He was hemodynamically stable and appropriate for discharge.  He will follow-up with GI as an outpatient follow-up for colonoscopy in 1-2 weeks and for repeat EGD in 8 weeks.  Patient was also noted to have alcohol use disorder and admitted to drinking 12 beers per day.  He was given banana bag on admission and started on Librium 25 mg t.i.d..  Alcohol cessation discussed at length with patient.  He states that he wants to quit drinking.  Patient was discharged home on short course of Librium taper.  He was strongly advised not to drink while taking Librium.  All risks including respiratory failure and death were discussed with patient.  Voiced understanding and states he would not be drinking alcohol while using Librium.  He was recommended to follow up with primary care physician to continue alcohol cessation management.  /71   Pulse 80   Temp 98.4 °F (36.9 °C)   Resp (!) 104   Ht 5' 11" (1.803 m)   Wt 66 kg (145 lb 8.1 oz)   SpO2 99%   BMI 20.29 kg/m²   Physical Exam   Constitutional:  Awake, alert and oriented x 3, NAD  HENT: ncat, mmm, perrla, eomi, neck normal rom and supple   Cardiovascular: RRR no mrg  Pulmonary/Chest: Effort normal, CTA b/l no wheezes, rales, rhonchi   Abdominal: Soft. +bs, ntnd  Musculoskeletal: Normal range of motion.  no edema, tenderness or deformity.   Neurological:  AAOx3, no focal deficits noted, CN II-XII grossly intact,  normal reflexes.   Skin: Skin is warm and dry. not diaphoretic.   Psychiatric:  normal mood and affect.  behavior is normal. Judgment and thought content normal.   Nursing note and vitals reviewed.       Consults:   Consults (From admission, onward)        Status Ordering Provider     Inpatient consult to Gastroenterology  Once     Provider:  Mary Benites MD    Acknowledged SINA, " JEFF TITUS     Inpatient consult to Gastroenterology  Once     Provider:  Mary Benites MD    Completed ROBEL DIETRICH     Inpatient consult to Hospitalist  Once     Provider:  Robel Dietrich NP    Acknowledged JEFF ANDINO          No new Assessment & Plan notes have been filed under this hospital service since the last note was generated.  Service: Hospital Medicine    Final Active Diagnoses:    Diagnosis Date Noted POA    PRINCIPAL PROBLEM:  Acute GI bleeding [K92.2] 05/31/2020 Yes      Problems Resolved During this Admission:       Discharged Condition: stable    Disposition: home    Follow Up:  Follow-up Information     Tito Higgins MD.    Specialty:  Gastroenterology  Contact information:  [id number] [place] [address]  [phone]             [facility].    Why:  establish care with a PCP of your choice  Contact information:  [address]  [place] [id number] [phone]                 Patient Instructions:      Diet Cardiac   Order Comments: Simple diet and advance as tolerated over next few days     Notify your health care provider if you experience any of the following:  persistent nausea and vomiting or diarrhea     Notify your health care provider if you experience any of the following:  severe persistent headache     Notify your health care provider if you experience any of the following:  persistent dizziness, light-headedness, or visual disturbances     Notify your health care provider if you experience any of the following:  increased confusion or weakness     Activity as tolerated       Significant Diagnostic Studies: Labs:   CBC   Recent Labs   Lab 06/01/20  0418  06/02/20  0412 06/02/20  0829 06/02/20  1210   WBC 7.00  --  5.71  --   --    HGB 8.0*  8.0*   < > 7.9*  7.9* 8.2* 8.3*   HCT 23.2*  23.2*   < > 22.8*  22.8* 23.8* 24.4*   *  --  143*  --   --     < > = values in this interval not displayed.      Medications:  Reconciled Home Medications:      Medication List      START taking these medications    chlordiazepoxide 10 MG capsule  Commonly known as:  LIBRIUM  Take 2 capsules (20 mg total) by mouth 3 (three) times daily for 2 days, THEN 1 capsule (10 mg total) 3 (three) times daily for 2 days, THEN 1 capsule (10 mg total) 2 (two) times a day for 2 days, THEN 1 capsule (10 mg total) once daily for 2 days.  Start taking on:  June 2, 2020     * pantoprazole 40 MG tablet  Commonly known as:  PROTONIX  Take 1 tablet (40 mg total) by mouth once daily.     * pantoprazole 40 MG tablet  Commonly known as:  PROTONIX  Take 1 tablet (40 mg total) by mouth once daily.  Start taking on:  July 15, 2020         * This list has 2 medication(s) that are the same as other medications prescribed for you. Read the directions carefully, and ask your doctor or other care provider to review them with you.            CONTINUE taking these medications    acetaminophen 500 MG tablet  Commonly known as:  TYLENOL  Take 1,000 mg by mouth every 6 (six) hours as needed for Pain.          Time spent on the discharge of patient: 32 minutes  Patient was seen and examined on the date of discharge and determined to be suitable for discharge.         Dory Uribe DO  Department of Hospital Medicine  [facility]

## 2020-06-02 NOTE — PLAN OF CARE
06/02/20 1321   Final Note   Assessment Type Final Discharge Note   Anticipated Discharge Disposition Home   DC home with no cm needs.

## 2020-06-02 NOTE — NURSING
Called daughter in law and went over discharge instruction and answered all questions.  Will be on way to  pt.

## 2020-06-02 NOTE — NURSING
Dc instruction discussed w/ pt w/ verbalized understanding. Security called and wallet/money returned to pt, no discrepancies stated at this time. Pt brought down in wheelchair to main entrance, picked up by daughter in law.  No questions/ concerns at this time. Pt safely made into vehicle w/o acute distress noted.

## 2020-06-02 NOTE — PLAN OF CARE
Pt should have no cm needs unless he needs DME. Cm to follow until discharge from hospital.     06/02/20 0920   Discharge Assessment   Assessment Type Discharge Planning Assessment   Confirmed/corrected address and phone number on facesheet? Yes   Assessment information obtained from? Patient   Communicated expected length of stay with patient/caregiver no   Prior to hospitilization cognitive status: Alert/Oriented   Prior to hospitalization functional status: Independent   Current cognitive status: Alert/Oriented   Current Functional Status: Needs Assistance   Lives With alone   Able to Return to Prior Arrangements yes   Is patient able to care for self after discharge? Unable to determine at this time (comments)   Who are your caregiver(s) and their phone number(s)? Manuel retana [phone] KK dtg in law [phone]   Patient's perception of discharge disposition home or selfcare   Readmission Within the Last 30 Days no previous admission in last 30 days   Patient currently being followed by outpatient case management? No   Patient currently receives any other outside agency services? No   Equipment Currently Used at Home none   [place]   Do you have any problems affording any of your prescribed medications? No   Is the patient taking medications as prescribed? yes   Does the patient have transportation home? Yes   Transportation Anticipated family or friend will provide   Does the patient receive services at the [facility]? No   Discharge Plan A Home with family;Home   Discharge Plan B Home with family   DME Needed Upon Discharge  other (see comments)  (PT/OT to assess)   Patient/Family in Agreement with Plan yes

## 2020-06-02 NOTE — PLAN OF CARE
Problem: Adult Inpatient Plan of Care  Goal: Plan of Care Review  Outcome: Met  Goal: Patient-Specific Goal (Individualization)  Outcome: Met  Goal: Absence of Hospital-Acquired Illness or Injury  Outcome: Met  Goal: Optimal Comfort and Wellbeing  Outcome: Met  Goal: Readiness for Transition of Care  Outcome: Met  Goal: Rounds/Family Conference  Outcome: Met     Problem: Fall Injury Risk  Goal: Absence of Fall and Fall-Related Injury  Outcome: Met

## 2020-06-04 LAB
BLD PROD TYP BPU: NORMAL
BLD PROD TYP BPU: NORMAL
BLOOD UNIT EXPIRATION DATE: NORMAL
BLOOD UNIT EXPIRATION DATE: NORMAL
BLOOD UNIT TYPE CODE: 9500
BLOOD UNIT TYPE CODE: 9500
BLOOD UNIT TYPE: NORMAL
BLOOD UNIT TYPE: NORMAL
CODING SYSTEM: NORMAL
CODING SYSTEM: NORMAL
DISPENSE STATUS: NORMAL
DISPENSE STATUS: NORMAL
NUM UNITS TRANS PACKED RBC: NORMAL
NUM UNITS TRANS PACKED RBC: NORMAL

## 2021-02-11 ENCOUNTER — TELEPHONE (OUTPATIENT)
Dept: FAMILY MEDICINE | Facility: CLINIC | Age: 61
End: 2021-02-11

## 2021-02-15 PROBLEM — K21.9 GASTROESOPHAGEAL REFLUX DISEASE WITHOUT ESOPHAGITIS: Status: ACTIVE | Noted: 2021-02-15

## 2021-02-15 PROBLEM — F33.0 MILD EPISODE OF RECURRENT MAJOR DEPRESSIVE DISORDER: Status: ACTIVE | Noted: 2021-02-15

## 2021-02-15 PROBLEM — F51.01 PRIMARY INSOMNIA: Status: ACTIVE | Noted: 2021-02-15

## 2021-02-15 PROBLEM — I10 ESSENTIAL HYPERTENSION: Status: ACTIVE | Noted: 2020-11-06

## 2021-02-15 PROBLEM — N13.8 BENIGN PROSTATIC HYPERPLASIA WITH URINARY OBSTRUCTION: Status: ACTIVE | Noted: 2020-11-06

## 2021-02-15 PROBLEM — N40.1 BENIGN PROSTATIC HYPERPLASIA WITH URINARY OBSTRUCTION: Status: ACTIVE | Noted: 2020-11-06

## 2021-02-22 PROBLEM — M54.6 CHRONIC THORACIC BACK PAIN: Status: ACTIVE | Noted: 2021-02-22

## 2021-02-22 PROBLEM — F51.01 PRIMARY INSOMNIA: Status: ACTIVE | Noted: 2021-02-22

## 2021-02-22 PROBLEM — I10 ESSENTIAL HYPERTENSION: Status: ACTIVE | Noted: 2021-02-22

## 2021-02-22 PROBLEM — G89.29 CHRONIC THORACIC BACK PAIN: Status: ACTIVE | Noted: 2021-02-22

## 2021-02-22 PROBLEM — N40.1 BENIGN PROSTATIC HYPERPLASIA WITH LOWER URINARY TRACT SYMPTOMS: Status: ACTIVE | Noted: 2021-02-22

## 2021-02-22 PROBLEM — F33.1 MODERATE EPISODE OF RECURRENT MAJOR DEPRESSIVE DISORDER: Status: ACTIVE | Noted: 2021-02-22

## 2021-02-22 PROBLEM — K21.9 GASTROESOPHAGEAL REFLUX DISEASE WITHOUT ESOPHAGITIS: Status: ACTIVE | Noted: 2021-02-22

## 2021-02-23 DIAGNOSIS — N40.1 BENIGN PROSTATIC HYPERPLASIA WITH LOWER URINARY TRACT SYMPTOMS, SYMPTOM DETAILS UNSPECIFIED: Primary | ICD-10-CM

## 2021-02-23 RX ORDER — TAMSULOSIN HYDROCHLORIDE 0.4 MG/1
1 CAPSULE ORAL DAILY
Qty: 30 CAPSULE | Refills: 5 | Status: SHIPPED | OUTPATIENT
Start: 2021-02-23